# Patient Record
Sex: FEMALE | Race: BLACK OR AFRICAN AMERICAN | NOT HISPANIC OR LATINO | Employment: UNEMPLOYED | URBAN - METROPOLITAN AREA
[De-identification: names, ages, dates, MRNs, and addresses within clinical notes are randomized per-mention and may not be internally consistent; named-entity substitution may affect disease eponyms.]

---

## 2023-03-21 ENCOUNTER — TELEPHONE (OUTPATIENT)
Dept: PRIMARY CARE | Facility: CLINIC | Age: 38
End: 2023-03-21
Payer: COMMERCIAL

## 2023-03-21 DIAGNOSIS — A60.00 GENITAL HERPES SIMPLEX, UNSPECIFIED SITE: ICD-10-CM

## 2023-03-21 DIAGNOSIS — R21 RASH: Primary | ICD-10-CM

## 2023-03-22 NOTE — TELEPHONE ENCOUNTER
Patient called and wanted these meds hydrocortisone 2% cream and hydrocortisone 2.5% lotion sent to her pharmacy.

## 2023-03-24 PROBLEM — A60.00 HERPES, GENITAL: Status: ACTIVE | Noted: 2023-03-24

## 2023-03-24 RX ORDER — TRIAMCINOLONE ACETONIDE 0.25 MG/G
OINTMENT TOPICAL 3 TIMES DAILY
Qty: 6 G | Refills: 1 | Status: SHIPPED | OUTPATIENT
Start: 2023-03-24 | End: 2023-03-27

## 2023-03-24 RX ORDER — VALACYCLOVIR HYDROCHLORIDE 1 G/1
1 TABLET, FILM COATED ORAL 3 TIMES DAILY
COMMUNITY
Start: 2014-08-18 | End: 2023-03-24 | Stop reason: SDUPTHER

## 2023-03-24 RX ORDER — TRIAMCINOLONE ACETONIDE 0.25 MG/G
1 CREAM TOPICAL 2 TIMES DAILY
Qty: 6 G | Refills: 1 | Status: SHIPPED | OUTPATIENT
Start: 2023-03-24 | End: 2023-03-27

## 2023-03-24 RX ORDER — TRIAMCINOLONE ACETONIDE 0.25 MG/G
1 CREAM TOPICAL 2 TIMES DAILY
COMMUNITY
Start: 2022-07-19 | End: 2023-03-24 | Stop reason: SDUPTHER

## 2023-03-24 RX ORDER — TRIAMCINOLONE ACETONIDE 0.25 MG/G
OINTMENT TOPICAL 3 TIMES DAILY
COMMUNITY
Start: 2021-04-13 | End: 2023-03-24 | Stop reason: SDUPTHER

## 2023-03-24 RX ORDER — VALACYCLOVIR HYDROCHLORIDE 1 G/1
1000 TABLET, FILM COATED ORAL 3 TIMES DAILY
Qty: 21 TABLET | Refills: 0 | Status: SHIPPED | OUTPATIENT
Start: 2023-03-24 | End: 2023-03-31

## 2023-03-25 DIAGNOSIS — R21 RASH: ICD-10-CM

## 2023-03-27 RX ORDER — TRIAMCINOLONE ACETONIDE 0.25 MG/G
OINTMENT TOPICAL
Qty: 6 G | Refills: 1 | Status: SHIPPED | OUTPATIENT
Start: 2023-03-27 | End: 2024-01-15 | Stop reason: SDUPTHER

## 2023-06-23 ENCOUNTER — LAB (OUTPATIENT)
Dept: LAB | Facility: LAB | Age: 38
End: 2023-06-23
Payer: COMMERCIAL

## 2023-06-23 ENCOUNTER — APPOINTMENT (OUTPATIENT)
Dept: PRIMARY CARE | Facility: CLINIC | Age: 38
End: 2023-06-23
Payer: COMMERCIAL

## 2023-06-23 ENCOUNTER — OFFICE VISIT (OUTPATIENT)
Dept: PRIMARY CARE | Facility: CLINIC | Age: 38
End: 2023-06-23
Payer: COMMERCIAL

## 2023-06-23 VITALS
HEIGHT: 67 IN | SYSTOLIC BLOOD PRESSURE: 121 MMHG | DIASTOLIC BLOOD PRESSURE: 75 MMHG | HEART RATE: 56 BPM | BODY MASS INDEX: 40.65 KG/M2 | WEIGHT: 259 LBS

## 2023-06-23 DIAGNOSIS — F32.A DEPRESSION, UNSPECIFIED DEPRESSION TYPE: Primary | ICD-10-CM

## 2023-06-23 DIAGNOSIS — F41.9 ANXIETY: ICD-10-CM

## 2023-06-23 DIAGNOSIS — Z00.00 HEALTH CARE MAINTENANCE: ICD-10-CM

## 2023-06-23 DIAGNOSIS — E66.01 CLASS 3 SEVERE OBESITY DUE TO EXCESS CALORIES WITH SERIOUS COMORBIDITY AND BODY MASS INDEX (BMI) OF 40.0 TO 44.9 IN ADULT (MULTI): ICD-10-CM

## 2023-06-23 PROBLEM — E66.813 CLASS 3 SEVERE OBESITY DUE TO EXCESS CALORIES WITH BODY MASS INDEX (BMI) OF 40.0 TO 44.9 IN ADULT: Status: ACTIVE | Noted: 2023-06-23

## 2023-06-23 PROBLEM — F17.210 CIGARETTE NICOTINE DEPENDENCE WITHOUT COMPLICATION: Status: ACTIVE | Noted: 2023-06-23

## 2023-06-23 LAB
ALANINE AMINOTRANSFERASE (SGPT) (U/L) IN SER/PLAS: 12 U/L (ref 7–45)
ALBUMIN (G/DL) IN SER/PLAS: 4.2 G/DL (ref 3.4–5)
ALKALINE PHOSPHATASE (U/L) IN SER/PLAS: 48 U/L (ref 33–110)
ANION GAP IN SER/PLAS: 14 MMOL/L (ref 10–20)
ASPARTATE AMINOTRANSFERASE (SGOT) (U/L) IN SER/PLAS: 11 U/L (ref 9–39)
BILIRUBIN TOTAL (MG/DL) IN SER/PLAS: 0.3 MG/DL (ref 0–1.2)
CALCIUM (MG/DL) IN SER/PLAS: 9.2 MG/DL (ref 8.6–10.6)
CARBON DIOXIDE, TOTAL (MMOL/L) IN SER/PLAS: 26 MMOL/L (ref 21–32)
CHLORIDE (MMOL/L) IN SER/PLAS: 104 MMOL/L (ref 98–107)
CHOLESTEROL (MG/DL) IN SER/PLAS: 154 MG/DL (ref 0–199)
CHOLESTEROL IN HDL (MG/DL) IN SER/PLAS: 55.5 MG/DL
CHOLESTEROL/HDL RATIO: 2.8
CREATININE (MG/DL) IN SER/PLAS: 0.84 MG/DL (ref 0.5–1.05)
ERYTHROCYTE DISTRIBUTION WIDTH (RATIO) BY AUTOMATED COUNT: 15.9 % (ref 11.5–14.5)
ERYTHROCYTE MEAN CORPUSCULAR HEMOGLOBIN CONCENTRATION (G/DL) BY AUTOMATED: 30.8 G/DL (ref 32–36)
ERYTHROCYTE MEAN CORPUSCULAR VOLUME (FL) BY AUTOMATED COUNT: 90 FL (ref 80–100)
ERYTHROCYTES (10*6/UL) IN BLOOD BY AUTOMATED COUNT: 3.87 X10E12/L (ref 4–5.2)
GFR FEMALE: >90 ML/MIN/1.73M2
GLUCOSE (MG/DL) IN SER/PLAS: 93 MG/DL (ref 74–99)
HEMATOCRIT (%) IN BLOOD BY AUTOMATED COUNT: 34.7 % (ref 36–46)
HEMOGLOBIN (G/DL) IN BLOOD: 10.7 G/DL (ref 12–16)
LDL: 74 MG/DL (ref 0–99)
LEUKOCYTES (10*3/UL) IN BLOOD BY AUTOMATED COUNT: 6.9 X10E9/L (ref 4.4–11.3)
NRBC (PER 100 WBCS) BY AUTOMATED COUNT: 0 /100 WBC (ref 0–0)
PLATELETS (10*3/UL) IN BLOOD AUTOMATED COUNT: 325 X10E9/L (ref 150–450)
POTASSIUM (MMOL/L) IN SER/PLAS: 4.7 MMOL/L (ref 3.5–5.3)
PROTEIN TOTAL: 7.2 G/DL (ref 6.4–8.2)
SODIUM (MMOL/L) IN SER/PLAS: 139 MMOL/L (ref 136–145)
THYROTROPIN (MIU/L) IN SER/PLAS BY DETECTION LIMIT <= 0.05 MIU/L: 2.07 MIU/L (ref 0.44–3.98)
TRIGLYCERIDE (MG/DL) IN SER/PLAS: 125 MG/DL (ref 0–149)
UREA NITROGEN (MG/DL) IN SER/PLAS: 10 MG/DL (ref 6–23)
VLDL: 25 MG/DL (ref 0–40)

## 2023-06-23 PROCEDURE — 84443 ASSAY THYROID STIM HORMONE: CPT

## 2023-06-23 PROCEDURE — 80053 COMPREHEN METABOLIC PANEL: CPT

## 2023-06-23 PROCEDURE — 36415 COLL VENOUS BLD VENIPUNCTURE: CPT

## 2023-06-23 PROCEDURE — 85027 COMPLETE CBC AUTOMATED: CPT

## 2023-06-23 PROCEDURE — 1036F TOBACCO NON-USER: CPT | Performed by: INTERNAL MEDICINE

## 2023-06-23 PROCEDURE — 99213 OFFICE O/P EST LOW 20 MIN: CPT | Performed by: INTERNAL MEDICINE

## 2023-06-23 PROCEDURE — 80061 LIPID PANEL: CPT

## 2023-06-23 PROCEDURE — 3008F BODY MASS INDEX DOCD: CPT | Performed by: INTERNAL MEDICINE

## 2023-06-23 RX ORDER — DULAGLUTIDE 0.75 MG/.5ML
0.75 INJECTION, SOLUTION SUBCUTANEOUS WEEKLY
COMMUNITY
Start: 2023-05-31

## 2023-06-23 RX ORDER — BUPROPION HYDROCHLORIDE 150 MG/1
150 TABLET ORAL EVERY MORNING
Qty: 30 TABLET | Refills: 1 | Status: SHIPPED | OUTPATIENT
Start: 2023-06-23 | End: 2023-08-22

## 2023-06-23 ASSESSMENT — PATIENT HEALTH QUESTIONNAIRE - PHQ9
SUM OF ALL RESPONSES TO PHQ9 QUESTIONS 1 AND 2: 6
2. FEELING DOWN, DEPRESSED OR HOPELESS: NEARLY EVERY DAY
1. LITTLE INTEREST OR PLEASURE IN DOING THINGS: NEARLY EVERY DAY

## 2023-06-23 NOTE — PROGRESS NOTES
"Subjective   Patient ID: Albertina Mckee is a 37 y.o. female who presents for sick visit           HPI     Patient is a 37-year-old female with past medical history of obesity presents with chief complaint of ongoing anxiety and depression.  She has had multiple triggers in the last year.  She has been having difficulty with her landlord.  She is in subsidized housing and [employer]  is supposed to be paying for her rent however her landlord is claiming that they are not paying and she owes back money.  She also got  in November.  She also states that she had an  in the last 3 months.  She is quite emotional and symptoms have been getting worse after the .  No alleviating factors.  She is not currently on any medications.  She was recently prescribed Trulicity for treatment of obesity at the [facility].  She denies suicidal homicidal ideation.  She currently works for her  who runs a [employer] business    Review of Systems  Constitutional: No fever or chills, No Night Sweats  Eyes: No Blurry Vision or Eye sight problems  ENT: No Nasal Discharge, Hoarseness, sore throat  Cardiovascular: no chest pain, no palpitations and no syncope.   Respiratory: no cough, no shortness of breath during exertion and no shortness of breath at rest.   Gastrointestinal: no abdominal pain, no nausea and no vomiting.   : No vaginal discharge, burning with urination, no blood in urine or stools  Skin: No Skin rashes or Lesions  Neuro: No Headache, no dizziness or Numbness or tingling  Psych: No Anxiety, depression or sleeping problems  Heme: No Easy bleeding or brusing.     Objective   /75   Pulse 56   Ht 1.702 m (5' 7\")   Wt 117 kg (259 lb)   BMI 40.57 kg/m²     Physical Exam  Patient declined Chaperone  Constitutional: Alert and in no acute distress. Well developed, well nourished.   Head and Face: Head and face: Normal.    Eyes: Normal external exam. Pupils were equal in size, round, reactive to " light (PERRL) with normal accommodation and extraocular movements intact (EOMI).   Ears, Nose, Mouth, and Throat: External inspection of ears and nose: Normal.  Hearing: Normal.  Nasal mucosa, septum, and turbinates: Normal.  Lips, teeth, and gums: Normal.  Oropharynx: Normal.   Neck: No neck mass was observed. Supple. Thyroid not enlarged and there were no palpable thyroid nodules.   Cardiovascular: Heart rate and rhythm were normal, normal S1 and S2. Pedal pulses: Normal. No peripheral edema.   Pulmonary: No respiratory distress. Clear bilateral breath sounds.   Breast: Normal Appearance, No Masses or lumps palpated  Abdomen: Soft nontender; no abdominal mass palpated. Normal bowel sounds. No organomegaly.   : Deferred   Musculoskeletal: No joint swelling seen, normal movements of all extremities. Range of motion: Normal.  Muscle strength/tone: Normal.    Skin: Normal skin color and pigmentation, normal skin turgor, and no rash.   Neurologic: Deep tendon reflexes were 2+ and symmetric.   Psychiatric: Judgment and insight: Intact. Mood and affect: Normal.  Lymphatic: No cervical lymphadenopathy. Palpation of lymph nodes in axillae: Normal.  Palpation of lymph nodes in groin: Normal.    Lab Results   Component Value Date    WBC 7.8 09/25/2020    HGB 10.9 (L) 09/25/2020    HCT 34.7 (L) 09/25/2020     09/25/2020    CHOL 134 09/25/2020    TRIG 104 09/25/2020    HDL 50.0 09/25/2020    ALT 18 09/25/2020    AST 13 09/25/2020     04/13/2021    K 3.8 04/13/2021     04/13/2021    CREATININE 0.85 04/13/2021    BUN 10 04/13/2021    CO2 27 04/13/2021    TSH 4.17 (H) 04/13/2021    HGBA1C 5.2 08/28/2018       US LIMITED BREAST  Narrative: Interpreted By:  KWASI DICKSON MD and MARIA DOLORES FREITAS MD  MRN: [id number]  Patient Name: RAFIA PALUMBO     STUDY:  BREAST ULTRASOUND; DIGITAL DIAG MAMM BILAT WITH DEV;  2/17/2023  12:18 pm; 2/17/2023 11:57 am     ACCESSION NUMBER(S):  [id number]; [id number]     ORDERING  CLINICIAN:  KELI BECK     INDICATION:  Patient reports a 1-1/2 year history of left breast hardness with  intermittent pain in the upper inner quadrant. Left breast lumps  between 12 and 2 o'clock.     COMPARISON:  Baseline exam.     FINDINGS:  MAMMOGRAPHY: 2D and tomosynthesis images were reviewed at 1 mm slice  thickness.     There are areas of scattered fibroglandular tissue.  No suspicious  masses or calcifications are identified.     ULTRASOUND:  Targeted ultrasound of the left breast was performed  using elastography.     Generous scanning of the entire superior left breast in the area of  pain and palpable lump was performed. No focal sonographic  abnormality is identified. The breast parenchyma is soft on  elastography.     Impression: No mammographic or targeted sonographic evidence of malignancy.  Recommend clinical management and follow-up for patient's  symptomatology.     BI-RADS CATEGORY:  Category: 1 - Negative.  Recommendation: Clinical management and follow-up.     For any future breast imaging appointments, please call [phone]).     Patient letter sent SAAPPR     I personally reviewed the images/study and I agree with the findings  as stated by Dr. Dial.  DIGITAL DIAG MAMM  Narrative: Interpreted By:  KWASI DICKSON MD and MARIA DOLORES FREITAS MD  MRN: [id number]  Patient Name: RAFIA PALUMBO     STUDY:  BREAST ULTRASOUND; DIGITAL DIAG MAMM BILAT WITH DEV;  2/17/2023  12:18 pm; 2/17/2023 11:57 am     ACCESSION NUMBER(S):  [id number]; [id number]     ORDERING CLINICIAN:  KELI BECK     INDICATION:  Patient reports a 1-1/2 year history of left breast hardness with  intermittent pain in the upper inner quadrant. Left breast lumps  between 12 and 2 o'clock.     COMPARISON:  Baseline exam.     FINDINGS:  MAMMOGRAPHY: 2D and tomosynthesis images were reviewed at 1 mm slice  thickness.     There are areas of scattered fibroglandular tissue.  No suspicious  masses or  calcifications are identified.     ULTRASOUND:  Targeted ultrasound of the left breast was performed  using elastography.     Generous scanning of the entire superior left breast in the area of  pain and palpable lump was performed. No focal sonographic  abnormality is identified. The breast parenchyma is soft on  elastography.     Impression: No mammographic or targeted sonographic evidence of malignancy.  Recommend clinical management and follow-up for patient's  symptomatology.     BI-RADS CATEGORY:  Category: 1 - Negative.  Recommendation: Clinical management and follow-up.     For any future breast imaging appointments, please call [phone] ([phone]).     Patient letter sent SAANIKKOR     I personally reviewed the images/study and I agree with the findings  as stated by Dr. Dial.      Assessment/Plan   Problem List Items Addressed This Visit          Endocrine/Metabolic    Class 3 severe obesity due to excess calories with body mass index (BMI) of 40.0 to 44.9 in adult (CMS/[facility])     Continue following with [facility] bariatric team.  Seems she was prescribed Trulicity for weight loss.  Titrate medication per team at [facility]            Other    Depression - Primary     Patient with multiple triggers at this time.  Deferring psychology evaluation at this time.  We will trial Wellbutrin 150 mg p.o. daily follow-up 1 month for reevaluation.         Relevant Medications    buPROPion XL (Wellbutrin XL) 150 mg 24 hr tablet    Anxiety    Relevant Medications    buPROPion XL (Wellbutrin XL) 150 mg 24 hr tablet     Other Visit Diagnoses       Health care maintenance        Relevant Orders    CBC    Comprehensive metabolic panel    Lipid Panel    TSH with reflex to Free T4 if abnormal

## 2023-06-23 NOTE — ASSESSMENT & PLAN NOTE
Continue following with [facility] bariatric team.  Seems she was prescribed Trulicity for weight loss.  Titrate medication per team at [facility]

## 2023-06-23 NOTE — ASSESSMENT & PLAN NOTE
Smoking Cessation   -Smoking coaches are available to you if need help with quitting ----->[phone]  -Try to cut back every day until you are able to quit   -If you need more resources to quit smoking I am here to help   -set yourself a quit date and work towards that goal    We will prescribe Wellbutrin.  Follow-up 30 days

## 2023-06-23 NOTE — ASSESSMENT & PLAN NOTE
Patient with multiple triggers at this time.  Deferring psychology evaluation at this time.  We will trial Wellbutrin 150 mg p.o. daily follow-up 1 month for reevaluation.

## 2023-06-28 ENCOUNTER — TELEPHONE (OUTPATIENT)
Dept: PRIMARY CARE | Facility: CLINIC | Age: 38
End: 2023-06-28

## 2023-08-15 ENCOUNTER — CLINICAL SUPPORT (OUTPATIENT)
Dept: PRIMARY CARE | Facility: CLINIC | Age: 38
End: 2023-08-15
Payer: COMMERCIAL

## 2023-08-15 DIAGNOSIS — Z11.1 PPD SCREENING TEST: Primary | ICD-10-CM

## 2023-08-15 PROCEDURE — 86580 TB INTRADERMAL TEST: CPT | Performed by: INTERNAL MEDICINE

## 2023-08-17 ENCOUNTER — CLINICAL SUPPORT (OUTPATIENT)
Dept: PRIMARY CARE | Facility: CLINIC | Age: 38
End: 2023-08-17
Payer: COMMERCIAL

## 2023-08-17 LAB
INDURATION: 0 MM
TB SKIN TEST: NEGATIVE

## 2023-09-23 DIAGNOSIS — K21.9 LARYNGOPHARYNGEAL REFLUX: ICD-10-CM

## 2023-09-25 RX ORDER — OMEPRAZOLE 40 MG/1
40 CAPSULE, DELAYED RELEASE ORAL DAILY
Qty: 90 CAPSULE | Refills: 1 | Status: SHIPPED | OUTPATIENT
Start: 2023-09-25

## 2023-10-02 PROBLEM — N63.21 BREAST LUMP ON LEFT SIDE AT 2 O'CLOCK POSITION: Status: ACTIVE | Noted: 2023-10-02

## 2023-10-02 PROBLEM — M77.9 TENDONITIS: Status: ACTIVE | Noted: 2023-10-02

## 2023-10-02 PROBLEM — H91.90 IMPAIRED HEARING: Status: ACTIVE | Noted: 2023-10-02

## 2023-10-02 PROBLEM — R29.898 WEAKNESS OF RIGHT LOWER EXTREMITY: Status: ACTIVE | Noted: 2023-10-02

## 2023-10-02 PROBLEM — E66.01 CLASS 2 SEVERE OBESITY WITH SERIOUS COMORBIDITY AND BODY MASS INDEX (BMI) OF 38.0 TO 38.9 IN ADULT (MULTI): Status: ACTIVE | Noted: 2023-10-02

## 2023-10-02 PROBLEM — E66.9 OBESITY, CLASS II, BMI 35-39.9: Status: ACTIVE | Noted: 2023-10-02

## 2023-10-02 PROBLEM — E66.9 OBESITY, CLASS I, BMI 30-34.9: Status: ACTIVE | Noted: 2023-10-02

## 2023-10-02 PROBLEM — M54.50 CHRONIC LOWER BACK PAIN: Status: ACTIVE | Noted: 2023-10-02

## 2023-10-02 PROBLEM — E66.812 OBESITY, CLASS II, BMI 35-39.9: Status: ACTIVE | Noted: 2023-10-02

## 2023-10-02 PROBLEM — M18.12 PRIMARY OSTEOARTHRITIS OF FIRST CARPOMETACARPAL JOINT OF LEFT HAND: Status: ACTIVE | Noted: 2023-10-02

## 2023-10-02 PROBLEM — M22.41 CHONDROMALACIA OF RIGHT PATELLOFEMORAL JOINT: Status: ACTIVE | Noted: 2023-10-02

## 2023-10-02 PROBLEM — E66.01 OBESITY, CLASS III, BMI 40-49.9 (MORBID OBESITY) (MULTI): Status: ACTIVE | Noted: 2023-10-02

## 2023-10-02 PROBLEM — S83.412A GRADE 1 INJURY OF MEDIAL COLLATERAL LIGAMENT OF LEFT KNEE: Status: ACTIVE | Noted: 2023-10-02

## 2023-10-02 PROBLEM — M17.10 PATELLOFEMORAL ARTHRITIS: Status: ACTIVE | Noted: 2023-10-02

## 2023-10-02 PROBLEM — O02.1 MISSED ABORTION (HHS-HCC): Status: ACTIVE | Noted: 2023-10-02

## 2023-10-02 PROBLEM — D64.9 ANEMIA: Status: ACTIVE | Noted: 2023-10-02

## 2023-10-02 PROBLEM — M12.812 ROTATOR CUFF ARTHROPATHY OF LEFT SHOULDER: Status: ACTIVE | Noted: 2023-10-02

## 2023-10-02 PROBLEM — S80.02XA CONTUSION OF KNEE, LEFT: Status: ACTIVE | Noted: 2023-10-02

## 2023-10-02 PROBLEM — S42.253A GREATER TUBEROSITY OF HUMERUS FRACTURE: Status: ACTIVE | Noted: 2023-10-02

## 2023-10-02 PROBLEM — E66.811 OBESITY, CLASS I, BMI 30-34.9: Status: ACTIVE | Noted: 2023-10-02

## 2023-10-02 PROBLEM — G89.29 CHRONIC LOWER BACK PAIN: Status: ACTIVE | Noted: 2023-10-02

## 2023-10-02 PROBLEM — O03.4 INCOMPLETE ABORTION (HHS-HCC): Status: ACTIVE | Noted: 2023-10-02

## 2023-10-02 PROBLEM — M70.22 OLECRANON BURSITIS OF LEFT ELBOW: Status: ACTIVE | Noted: 2023-10-02

## 2023-10-02 PROBLEM — E66.813 OBESITY, CLASS III, BMI 40-49.9 (MORBID OBESITY): Status: ACTIVE | Noted: 2023-10-02

## 2023-10-02 PROBLEM — O20.0 THREATENED ABORTION (HHS-HCC): Status: ACTIVE | Noted: 2023-10-02

## 2023-10-02 PROBLEM — O20.0 THREATENED MISCARRIAGE (HHS-HCC): Status: ACTIVE | Noted: 2023-10-02

## 2023-10-02 PROBLEM — M23.8X9: Status: ACTIVE | Noted: 2023-10-02

## 2023-10-02 PROBLEM — K21.9 LARYNGOPHARYNGEAL REFLUX (LPR): Status: ACTIVE | Noted: 2023-10-02

## 2023-10-02 PROBLEM — E66.812 CLASS 2 SEVERE OBESITY WITH SERIOUS COMORBIDITY AND BODY MASS INDEX (BMI) OF 38.0 TO 38.9 IN ADULT: Status: ACTIVE | Noted: 2023-10-02

## 2023-10-02 RX ORDER — HYDROCORTISONE 1 %
CREAM (GRAM) TOPICAL 2 TIMES DAILY
COMMUNITY
End: 2024-01-15 | Stop reason: SDUPTHER

## 2023-10-02 RX ORDER — FLUTICASONE PROPIONATE 50 MCG
1 SPRAY, SUSPENSION (ML) NASAL EVERY 12 HOURS
COMMUNITY

## 2023-10-05 ENCOUNTER — OFFICE VISIT (OUTPATIENT)
Dept: ORTHOPEDIC SURGERY | Facility: CLINIC | Age: 38
End: 2023-10-05
Payer: COMMERCIAL

## 2023-10-05 ENCOUNTER — ANCILLARY PROCEDURE (OUTPATIENT)
Dept: RADIOLOGY | Facility: CLINIC | Age: 38
End: 2023-10-05
Payer: COMMERCIAL

## 2023-10-05 VITALS — WEIGHT: 258 LBS | HEIGHT: 67 IN | BODY MASS INDEX: 40.49 KG/M2

## 2023-10-05 DIAGNOSIS — M17.10 PATELLOFEMORAL ARTHRITIS: ICD-10-CM

## 2023-10-05 PROCEDURE — 73564 X-RAY EXAM KNEE 4 OR MORE: CPT | Mod: RIGHT SIDE | Performed by: RADIOLOGY

## 2023-10-05 PROCEDURE — 3008F BODY MASS INDEX DOCD: CPT | Performed by: ORTHOPAEDIC SURGERY

## 2023-10-05 PROCEDURE — 73560 X-RAY EXAM OF KNEE 1 OR 2: CPT | Mod: LT

## 2023-10-05 PROCEDURE — 99213 OFFICE O/P EST LOW 20 MIN: CPT | Performed by: ORTHOPAEDIC SURGERY

## 2023-10-05 PROCEDURE — 73562 X-RAY EXAM OF KNEE 3: CPT | Mod: RT

## 2023-10-05 ASSESSMENT — PAIN - FUNCTIONAL ASSESSMENT: PAIN_FUNCTIONAL_ASSESSMENT: 0-10

## 2023-10-05 ASSESSMENT — PAIN SCALES - GENERAL: PAINLEVEL_OUTOF10: 7

## 2023-10-05 ASSESSMENT — PAIN DESCRIPTION - DESCRIPTORS: DESCRIPTORS: ACHING;SHARP;DISCOMFORT

## 2023-10-05 NOTE — LETTER
October 5, 2023     Topher Rob DO  [address]   [address]  [facility] [address]    Patient: Albertina Mckee   YOB: 1985   Date of Visit: 10/5/2023       Dear Dr. Topher Rob DO:    Thank you for referring Albertina Mckee to me for evaluation. Below are my notes for this consultation.  If you have questions, please do not hesitate to call me. I look forward to following your patient along with you.       Sincerely,     Tesfaye Mendez MD      CC: No Recipients  ______________________________________________________________________________________    Patient is a 37-year-old female presents today for evaluation of bilateral knee pain.  She previously saw Dr. Giovanny cerda.  It sounds like she had arthroscopy on the right side.  She is a previous cortisone injections.  She is never had viscosupplementation.  Her knees bother her equally.  She has difficulty with distance and stairs.    Adult patient history sheet was filled out by the patient today in clinic.  This includes Past Medical History, Past Surgical History, Medications, Allergies, Social History, Family History and 30 point review of systems.    Bilateral knees:  AAOx3, NAD,, obese, walks with a mild antalgic gait  Varus allignment  Range of motion lacks 10 degrees of full extension and flexes to 110 degrees  Stable to varus/valgus/anterior/posterior stress through out the range of motion  Slight laxity with varus stress  Diffuse medial  joint line tenderness to palpation  Moderate effusion  SILT in a joe/saph/per/tib distribution  5/5 knee extension/df/pf/ehl  ½ dorsalis pedis and posterior tibial pulse  no popliteal lymphadenopathy  no other overlying lesions  mood: euthymic  Respirations non labored    Plain films were reviewed by myself in clinic today.  She has mild to moderate degenerative changes otherwise negative for osseous or soft tissue abnormality.    We discussed continued conservative treatment including  anti-inflammatories, injections, therapy and weight loss.  She is not a candidate at 37 for knee replacement at this time.  She can continue activity as tolerated.  She can follow-up with us on an as-needed basis.  We did give her Dr. John card for possible Khushboo supplementation if she desires.    This note was created using voice recognition software and was not corrected for typographical or grammatical errors.

## 2023-10-05 NOTE — PROGRESS NOTES
Patient is a 37-year-old female presents today for evaluation of bilateral knee pain.  She previously saw Dr. Giovanny cerda.  It sounds like she had arthroscopy on the right side.  She is a previous cortisone injections.  She is never had viscosupplementation.  Her knees bother her equally.  She has difficulty with distance and stairs.    Adult patient history sheet was filled out by the patient today in clinic.  This includes Past Medical History, Past Surgical History, Medications, Allergies, Social History, Family History and 30 point review of systems.    Bilateral knees:  AAOx3, NAD,, obese, walks with a mild antalgic gait  Varus allignment  Range of motion lacks 10 degrees of full extension and flexes to 110 degrees  Stable to varus/valgus/anterior/posterior stress through out the range of motion  Slight laxity with varus stress  Diffuse medial  joint line tenderness to palpation  Moderate effusion  SILT in a joe/saph/per/tib distribution  5/5 knee extension/df/pf/ehl  ½ dorsalis pedis and posterior tibial pulse  no popliteal lymphadenopathy  no other overlying lesions  mood: euthymic  Respirations non labored    Plain films were reviewed by myself in clinic today.  She has mild to moderate degenerative changes otherwise negative for osseous or soft tissue abnormality.    We discussed continued conservative treatment including anti-inflammatories, injections, therapy and weight loss.  She is not a candidate at 37 for knee replacement at this time.  She can continue activity as tolerated.  She can follow-up with us on an as-needed basis.  We did give her Dr. John card for possible Khushboo supplementation if she desires.    This note was created using voice recognition software and was not corrected for typographical or grammatical errors.

## 2023-12-18 ENCOUNTER — APPOINTMENT (OUTPATIENT)
Dept: PRIMARY CARE | Facility: CLINIC | Age: 38
End: 2023-12-18
Payer: COMMERCIAL

## 2024-01-15 ENCOUNTER — OFFICE VISIT (OUTPATIENT)
Dept: PRIMARY CARE | Facility: CLINIC | Age: 39
End: 2024-01-15
Payer: COMMERCIAL

## 2024-01-15 ENCOUNTER — LAB (OUTPATIENT)
Dept: LAB | Facility: LAB | Age: 39
End: 2024-01-15
Payer: COMMERCIAL

## 2024-01-15 VITALS
HEIGHT: 67 IN | HEART RATE: 58 BPM | WEIGHT: 260 LBS | SYSTOLIC BLOOD PRESSURE: 135 MMHG | OXYGEN SATURATION: 98 % | DIASTOLIC BLOOD PRESSURE: 84 MMHG | BODY MASS INDEX: 40.81 KG/M2

## 2024-01-15 DIAGNOSIS — A60.00 GENITAL HERPES SIMPLEX, UNSPECIFIED SITE: ICD-10-CM

## 2024-01-15 DIAGNOSIS — F17.210 CIGARETTE NICOTINE DEPENDENCE WITHOUT COMPLICATION: ICD-10-CM

## 2024-01-15 DIAGNOSIS — G89.29 CHRONIC LOW BACK PAIN, UNSPECIFIED BACK PAIN LATERALITY, UNSPECIFIED WHETHER SCIATICA PRESENT: ICD-10-CM

## 2024-01-15 DIAGNOSIS — D64.9 ANEMIA, UNSPECIFIED TYPE: Primary | ICD-10-CM

## 2024-01-15 DIAGNOSIS — M54.50 CHRONIC LOW BACK PAIN, UNSPECIFIED BACK PAIN LATERALITY, UNSPECIFIED WHETHER SCIATICA PRESENT: ICD-10-CM

## 2024-01-15 DIAGNOSIS — M25.569 KNEE PAIN, UNSPECIFIED CHRONICITY, UNSPECIFIED LATERALITY: ICD-10-CM

## 2024-01-15 DIAGNOSIS — B00.9 HSV INFECTION: ICD-10-CM

## 2024-01-15 DIAGNOSIS — B37.9 YEAST INFECTION: ICD-10-CM

## 2024-01-15 DIAGNOSIS — E66.01 CLASS 3 SEVERE OBESITY DUE TO EXCESS CALORIES WITH SERIOUS COMORBIDITY AND BODY MASS INDEX (BMI) OF 40.0 TO 44.9 IN ADULT (MULTI): ICD-10-CM

## 2024-01-15 DIAGNOSIS — Z00.00 HEALTH CARE MAINTENANCE: ICD-10-CM

## 2024-01-15 DIAGNOSIS — R21 RASH: ICD-10-CM

## 2024-01-15 PROBLEM — E66.811 OBESITY, CLASS I, BMI 30-34.9: Status: RESOLVED | Noted: 2023-10-02 | Resolved: 2024-01-15

## 2024-01-15 PROBLEM — E66.9 OBESITY, CLASS I, BMI 30-34.9: Status: RESOLVED | Noted: 2023-10-02 | Resolved: 2024-01-15

## 2024-01-15 PROBLEM — O20.0 THREATENED ABORTION (HHS-HCC): Status: RESOLVED | Noted: 2023-10-02 | Resolved: 2024-01-15

## 2024-01-15 PROBLEM — N63.21 BREAST LUMP ON LEFT SIDE AT 2 O'CLOCK POSITION: Status: RESOLVED | Noted: 2023-10-02 | Resolved: 2024-01-15

## 2024-01-15 PROBLEM — O20.0 THREATENED MISCARRIAGE (HHS-HCC): Status: RESOLVED | Noted: 2023-10-02 | Resolved: 2024-01-15

## 2024-01-15 PROBLEM — O02.1 MISSED ABORTION (HHS-HCC): Status: RESOLVED | Noted: 2023-10-02 | Resolved: 2024-01-15

## 2024-01-15 PROBLEM — E66.812 OBESITY, CLASS II, BMI 35-39.9: Status: RESOLVED | Noted: 2023-10-02 | Resolved: 2024-01-15

## 2024-01-15 PROBLEM — E66.812 CLASS 2 SEVERE OBESITY WITH SERIOUS COMORBIDITY AND BODY MASS INDEX (BMI) OF 38.0 TO 38.9 IN ADULT: Status: RESOLVED | Noted: 2023-10-02 | Resolved: 2024-01-15

## 2024-01-15 PROBLEM — E66.813 OBESITY, CLASS III, BMI 40-49.9 (MORBID OBESITY): Status: RESOLVED | Noted: 2023-10-02 | Resolved: 2024-01-15

## 2024-01-15 PROBLEM — O03.4 INCOMPLETE ABORTION (HHS-HCC): Status: RESOLVED | Noted: 2023-10-02 | Resolved: 2024-01-15

## 2024-01-15 PROBLEM — K21.9 LARYNGOPHARYNGEAL REFLUX (LPR): Status: RESOLVED | Noted: 2023-10-02 | Resolved: 2024-01-15

## 2024-01-15 PROBLEM — E66.9 OBESITY, CLASS II, BMI 35-39.9: Status: RESOLVED | Noted: 2023-10-02 | Resolved: 2024-01-15

## 2024-01-15 LAB
ALBUMIN SERPL BCP-MCNC: 4 G/DL (ref 3.4–5)
ALP SERPL-CCNC: 45 U/L (ref 33–110)
ALT SERPL W P-5'-P-CCNC: 9 U/L (ref 7–45)
ANION GAP SERPL CALC-SCNC: 9 MMOL/L (ref 10–20)
APPEARANCE UR: ABNORMAL
AST SERPL W P-5'-P-CCNC: 8 U/L (ref 9–39)
BACTERIA #/AREA URNS AUTO: ABNORMAL /HPF
BILIRUB SERPL-MCNC: 0.3 MG/DL (ref 0–1.2)
BILIRUB UR STRIP.AUTO-MCNC: NEGATIVE MG/DL
BUN SERPL-MCNC: 7 MG/DL (ref 6–23)
CALCIUM SERPL-MCNC: 9 MG/DL (ref 8.6–10.6)
CHLORIDE SERPL-SCNC: 106 MMOL/L (ref 98–107)
CO2 SERPL-SCNC: 28 MMOL/L (ref 21–32)
COLOR UR: YELLOW
CREAT SERPL-MCNC: 0.76 MG/DL (ref 0.5–1.05)
EGFRCR SERPLBLD CKD-EPI 2021: >90 ML/MIN/1.73M*2
ERYTHROCYTE [DISTWIDTH] IN BLOOD BY AUTOMATED COUNT: 15.9 % (ref 11.5–14.5)
GLUCOSE SERPL-MCNC: 76 MG/DL (ref 74–99)
GLUCOSE UR STRIP.AUTO-MCNC: NEGATIVE MG/DL
HCT VFR BLD AUTO: 34.7 % (ref 36–46)
HGB BLD-MCNC: 11 G/DL (ref 12–16)
KETONES UR STRIP.AUTO-MCNC: NEGATIVE MG/DL
LEUKOCYTE ESTERASE UR QL STRIP.AUTO: ABNORMAL
MCH RBC QN AUTO: 28.2 PG (ref 26–34)
MCHC RBC AUTO-ENTMCNC: 31.7 G/DL (ref 32–36)
MCV RBC AUTO: 89 FL (ref 80–100)
MUCOUS THREADS #/AREA URNS AUTO: ABNORMAL /LPF
NITRITE UR QL STRIP.AUTO: NEGATIVE
NRBC BLD-RTO: 0 /100 WBCS (ref 0–0)
PH UR STRIP.AUTO: 7 [PH]
PLATELET # BLD AUTO: 340 X10*3/UL (ref 150–450)
POTASSIUM SERPL-SCNC: 4.1 MMOL/L (ref 3.5–5.3)
PROT SERPL-MCNC: 6.9 G/DL (ref 6.4–8.2)
PROT UR STRIP.AUTO-MCNC: NEGATIVE MG/DL
RBC # BLD AUTO: 3.9 X10*6/UL (ref 4–5.2)
RBC # UR STRIP.AUTO: ABNORMAL /UL
RBC #/AREA URNS AUTO: ABNORMAL /HPF
SODIUM SERPL-SCNC: 139 MMOL/L (ref 136–145)
SP GR UR STRIP.AUTO: 1.01
SQUAMOUS #/AREA URNS AUTO: ABNORMAL /HPF
UROBILINOGEN UR STRIP.AUTO-MCNC: <2 MG/DL
WBC # BLD AUTO: 7.6 X10*3/UL (ref 4.4–11.3)
WBC #/AREA URNS AUTO: ABNORMAL /HPF

## 2024-01-15 PROCEDURE — 99214 OFFICE O/P EST MOD 30 MIN: CPT | Performed by: INTERNAL MEDICINE

## 2024-01-15 PROCEDURE — 36415 COLL VENOUS BLD VENIPUNCTURE: CPT

## 2024-01-15 PROCEDURE — 85027 COMPLETE CBC AUTOMATED: CPT

## 2024-01-15 PROCEDURE — 81001 URINALYSIS AUTO W/SCOPE: CPT

## 2024-01-15 PROCEDURE — 3008F BODY MASS INDEX DOCD: CPT | Performed by: INTERNAL MEDICINE

## 2024-01-15 PROCEDURE — 4004F PT TOBACCO SCREEN RCVD TLK: CPT | Performed by: INTERNAL MEDICINE

## 2024-01-15 PROCEDURE — 80053 COMPREHEN METABOLIC PANEL: CPT

## 2024-01-15 RX ORDER — TRIAMCINOLONE ACETONIDE 0.25 MG/G
OINTMENT TOPICAL 3 TIMES DAILY
Qty: 6 G | Refills: 1 | Status: SHIPPED | OUTPATIENT
Start: 2024-01-15 | End: 2024-05-10

## 2024-01-15 RX ORDER — HYDROCORTISONE 1 %
CREAM (GRAM) TOPICAL 2 TIMES DAILY
Qty: 224 G | Refills: 1 | Status: SHIPPED | OUTPATIENT
Start: 2024-01-15

## 2024-01-15 RX ORDER — NAPROXEN 500 MG/1
500 TABLET ORAL 2 TIMES DAILY PRN
Qty: 60 TABLET | Refills: 0 | Status: SHIPPED | OUTPATIENT
Start: 2024-01-15 | End: 2024-04-14

## 2024-01-15 RX ORDER — VALACYCLOVIR HYDROCHLORIDE 500 MG/1
500 TABLET, FILM COATED ORAL DAILY
Qty: 30 TABLET | Refills: 5 | Status: SHIPPED | OUTPATIENT
Start: 2024-01-15 | End: 2025-01-14

## 2024-01-15 RX ORDER — DICLOFENAC SODIUM 10 MG/G
4 GEL TOPICAL 4 TIMES DAILY
Qty: 100 G | Refills: 1 | Status: SHIPPED | OUTPATIENT
Start: 2024-01-15

## 2024-01-15 RX ORDER — FLUCONAZOLE 150 MG/1
150 TABLET ORAL ONCE
Qty: 1 TABLET | Refills: 0 | Status: SHIPPED | OUTPATIENT
Start: 2024-01-15 | End: 2024-01-15

## 2024-01-15 RX ORDER — FERROUS SULFATE 324(65)MG
65 TABLET, DELAYED RELEASE (ENTERIC COATED) ORAL
Qty: 90 TABLET | Refills: 1 | Status: SHIPPED | OUTPATIENT
Start: 2024-01-15

## 2024-01-15 NOTE — PROGRESS NOTES
"Subjective   Patient ID: Albertina Dominguez is a 38 y.o. female who presents for sick visit           HPI     Patient is a 38-year-old female with past medical history of obesity presents for follow-up  Patient is concerned about vaginal itching without odor ongoing x 1 week.  She is concerned about a yeast infection.  No recent antibiotic use    She does have a history of anemia and was advised to take some ferrous sulfate although she has not been using it.  She needs a prescription.    She is concerned about cancer screenings given the fact that an aunt was diagnosed with B-cell lymphoma and she has a cousin diagnosed with cancer but she does not know what it was    She has chronic knee pain for which she is requesting prescription strength ibuprofen.      Review of Systems  Constitutional: No fever or chills, No Night Sweats  Eyes: No Blurry Vision or Eye sight problems  ENT: No Nasal Discharge, Hoarseness, sore throat  Cardiovascular: no chest pain, no palpitations and no syncope.   Respiratory: no cough, no shortness of breath during exertion and no shortness of breath at rest.   Gastrointestinal: no abdominal pain, no nausea and no vomiting.   : No vaginal discharge, burning with urination, no blood in urine or stools  Skin: No Skin rashes or Lesions  Neuro: No Headache, no dizziness or Numbness or tingling  Psych: No Anxiety, depression or sleeping problems  Heme: No Easy bleeding or brusing.     Objective   /84 (BP Location: Right arm, Patient Position: Sitting, BP Cuff Size: Adult)   Pulse 58   Ht 1.702 m (5' 7\")   Wt 118 kg (260 lb)   SpO2 98%   BMI 40.72 kg/m²     Physical Exam  Patient declined Chaperone  Constitutional: Alert and in no acute distress. Well developed, well nourished.   Head and Face: Head and face: Normal.    Eyes: Normal external exam. Pupils were equal in size, round, reactive to light (PERRL) with normal accommodation and extraocular movements intact (EOMI).   Ears, Nose, " Mouth, and Throat: External inspection of ears and nose: Normal.  Hearing: Normal.  Nasal mucosa, septum, and turbinates: Normal.  Lips, teeth, and gums: Normal.  Oropharynx: Normal.   Neck: No neck mass was observed. Supple. Thyroid not enlarged and there were no palpable thyroid nodules.   Cardiovascular: Heart rate and rhythm were normal, normal S1 and S2. Pedal pulses: Normal. No peripheral edema.   Pulmonary: No respiratory distress. Clear bilateral breath sounds.   Breast: Normal Appearance, No Masses or lumps palpated  Abdomen: Soft nontender; no abdominal mass palpated. Normal bowel sounds. No organomegaly.   : Deferred   Musculoskeletal: No joint swelling seen, normal movements of all extremities. Range of motion: Normal.  Muscle strength/tone: Normal.    Skin: Normal skin color and pigmentation, normal skin turgor, and no rash.   Neurologic: Deep tendon reflexes were 2+ and symmetric.   Psychiatric: Judgment and insight: Intact. Mood and affect: Normal.  Lymphatic: No cervical lymphadenopathy. Palpation of lymph nodes in axillae: Normal.  Palpation of lymph nodes in groin: Normal.    Lab Results   Component Value Date    WBC 6.9 06/23/2023    HGB 10.7 (L) 06/23/2023    HCT 34.7 (L) 06/23/2023     06/23/2023    CHOL 154 06/23/2023    TRIG 125 06/23/2023    HDL 55.5 06/23/2023    ALT 12 06/23/2023    AST 11 06/23/2023     06/23/2023    K 4.7 06/23/2023     06/23/2023    CREATININE 0.84 06/23/2023    BUN 10 06/23/2023    CO2 26 06/23/2023    TSH 2.07 06/23/2023    HGBA1C 5.2 08/28/2018       XR knee right 3 views, XR knee left 1-2 views  Narrative: Interpreted By:  Sabino Diaz,   STUDY:  Bilateral knee dated  10/5/2023.      INDICATION:  Signs/Symptoms:pain      COMPARISON:  Radiographs dated 10/08/2020.      ACCESSION NUMBER(S):  [id number]; [id number]      ORDERING CLINICIAN:  ANJU GRANGER      TECHNIQUE:  Four views of the bilateral knee.      FINDINGS:  No fracture or  dislocation is evident.  No knee effusion is evident.  There is mild tricompartmental degenerative change of the knees,  greatest in the right patellofemoral joint space. There is a surface  osteophyte on the right medial femoral condyle. No soft tissue gas or  radiopaque foreign body is evident.      Impression: Degenerative changes without osseous injury evident.      MACRO:  None      Signed by: Sabino Diaz 10/5/2023 2:20 PM  Dictation workstation:   [id number]      Assessment/Plan   Problem List Items Addressed This Visit       Herpes, genital     Will provide chronic suppressive therapy         Class 3 severe obesity due to excess calories with body mass index (BMI) of 40.0 to 44.9 in adult (CMS/[facility])     Encouraged weight reduction and regular exercise         Cigarette nicotine dependence without complication     Explained that one of the best ways to prevent cancer was to stop smoking.  Patient is not ready.         Anemia - Primary     Check iron and CBC  Patient wishes to be evaluated by gastroenterology         Relevant Medications    ferrous sulfate 324 mg (65 mg elemental iron) EC tablet (delayed release)    Other Relevant Orders    Referral to Gastroenterology    Chronic lower back pain     Will prescribe ibuprofen to be taken as needed as well as Voltaren gel          Other Visit Diagnoses       Yeast infection        Relevant Medications    fluconazole (Diflucan) 150 mg tablet    Health care maintenance        Relevant Medications    hydrocortisone 1 % cream    Other Relevant Orders    CBC    Comprehensive metabolic panel    Urinalysis with Reflex Microscopic    HSV infection        Relevant Medications    valACYclovir (Valtrex) 500 mg tablet    Rash        Relevant Medications    triamcinolone (Kenalog) 0.025 % ointment    Knee pain, unspecified chronicity, unspecified laterality        Relevant Medications    diclofenac sodium (Voltaren) 1 % gel gel    naproxen (Naprosyn) 500 mg tablet    Other  Relevant Orders    Referral to Physical Therapy

## 2024-04-08 ENCOUNTER — TELEMEDICINE (OUTPATIENT)
Dept: PRIMARY CARE | Facility: CLINIC | Age: 39
End: 2024-04-08
Payer: COMMERCIAL

## 2024-04-08 DIAGNOSIS — R05.8 POST-VIRAL COUGH SYNDROME: Primary | ICD-10-CM

## 2024-04-08 PROCEDURE — 99442 PR PHYS/QHP TELEPHONE EVALUATION 11-20 MIN: CPT | Performed by: INTERNAL MEDICINE

## 2024-04-08 PROCEDURE — 3008F BODY MASS INDEX DOCD: CPT | Performed by: INTERNAL MEDICINE

## 2024-04-08 PROCEDURE — 4004F PT TOBACCO SCREEN RCVD TLK: CPT | Performed by: INTERNAL MEDICINE

## 2024-04-08 RX ORDER — BENZONATATE 200 MG/1
200 CAPSULE ORAL 3 TIMES DAILY PRN
Qty: 42 CAPSULE | Refills: 0 | Status: SHIPPED | OUTPATIENT
Start: 2024-04-08 | End: 2024-05-08

## 2024-04-08 RX ORDER — METHYLPREDNISOLONE 4 MG/1
TABLET ORAL
Qty: 21 TABLET | Refills: 0 | Status: SHIPPED | OUTPATIENT
Start: 2024-04-08 | End: 2024-04-15

## 2024-04-08 NOTE — PROGRESS NOTES
Subjective   Patient ID: Albertina Dominguez is a 38 y.o. female who presents for No chief complaint on file..    HPI     I discussed with the patient the potential benefits and risks of the use of telephone or video-conferencing that differ from in-person services (e.g., limits to patient confidentiality, limitations on the provider’s ability to observe the patient, limitations on the diagnostic tools available). I explained to the patient that I may determine at any point that telehealth services are not appropriate based on the patient’s circumstances and either party may therefore end the service to schedule an alternative in-person service or contact 911 to address a medical emergency. With the understanding of these risks, benefits and alternatives, the patient agreed to use the telephone or video-conferencing platform selected for this virtual session and further the patient confirmed his/her understanding that the services do not guarantee a specific outcome or recovery.  Patient confirms they are currently physically located in the [facility] at this time.     Patient is a 38-year-old female who presents with chief complaint of ongoing cough following an upper respiratory tract infection last week.  She denies any shortness of breath or fevers.  She is been taking over-the-counter Robitussin and Tylenol without relief.  No dyspnea on exertion.  Nonproductive cough      Review of Systems:  Constitutional: No fever or chills  Cardiovascular: no chest pain, no palpitations and no syncope.   Respiratory: cough, no shortness of breath during exertion and no shortness of breath at rest.   Gastrointestinal: no abdominal pain, no nausea and no vomiting.  Neuro: No Headache, no dizziness    Physical Exam:   Unable to perform due to telephone visit    Assessment/Plan   Problem List Items Addressed This Visit    None  Visit Diagnoses         Codes    Post-viral cough syndrome    -  Primary R05.8    Relevant  Medications    methylPREDNISolone (Medrol Dospak) 4 mg tablets    benzonatate (Tessalon) 200 mg capsule    Other Relevant Orders    XR chest 2 views            1.  Likely postviral cough syndrome.  Will provide Medrol for symptomatic relief as well as Tessalon Perles to suppress the cough.  Will obtain chest x-ray.  If evidence of pneumonia will treat.  However given patient does not feel terribly ill and for the most part the symptom is coughing it is likely and probably a postviral cough syndrome which is for the most part self-limiting      This Medical recommendation has been made based on the Telephonic/Video conversation and the history is given by the patient, which I as a Physician and the patient also understand that there are limitations given the lack of an in-person Physical Exam. Patient will call back if symptoms don't improve.        A Doctor is always available by phone when the office is closed. Please feel free to call for help with any problem that you feel shouldn't wait until the office re-opens.     Topher Rob, DO

## 2024-04-26 ENCOUNTER — TELEPHONE (OUTPATIENT)
Dept: PRIMARY CARE | Facility: CLINIC | Age: 39
End: 2024-04-26
Payer: COMMERCIAL

## 2024-04-26 DIAGNOSIS — B37.31 YEAST INFECTION OF THE VAGINA: Primary | ICD-10-CM

## 2024-04-28 RX ORDER — FLUCONAZOLE 150 MG/1
150 TABLET ORAL ONCE
Qty: 1 TABLET | Refills: 0 | Status: SHIPPED | OUTPATIENT
Start: 2024-04-28 | End: 2024-04-28

## 2024-05-09 DIAGNOSIS — R21 RASH: ICD-10-CM

## 2024-05-10 RX ORDER — TRIAMCINOLONE ACETONIDE 0.25 MG/G
OINTMENT TOPICAL
Qty: 15 G | Refills: 0 | Status: SHIPPED | OUTPATIENT
Start: 2024-05-10

## 2024-06-17 ENCOUNTER — TELEPHONE (OUTPATIENT)
Dept: PRIMARY CARE | Facility: CLINIC | Age: 39
End: 2024-06-17
Payer: COMMERCIAL

## 2024-06-18 ENCOUNTER — TELEMEDICINE (OUTPATIENT)
Dept: PRIMARY CARE | Facility: CLINIC | Age: 39
End: 2024-06-18
Payer: COMMERCIAL

## 2024-06-18 DIAGNOSIS — Z20.2 TRICHOMONAS EXPOSURE: Primary | ICD-10-CM

## 2024-06-18 PROCEDURE — 99213 OFFICE O/P EST LOW 20 MIN: CPT | Performed by: INTERNAL MEDICINE

## 2024-06-18 PROCEDURE — 3008F BODY MASS INDEX DOCD: CPT | Performed by: INTERNAL MEDICINE

## 2024-06-18 PROCEDURE — 4004F PT TOBACCO SCREEN RCVD TLK: CPT | Performed by: INTERNAL MEDICINE

## 2024-06-18 RX ORDER — TINIDAZOLE 500 MG/1
2 TABLET ORAL ONCE
Qty: 4 TABLET | Refills: 0 | Status: SHIPPED | OUTPATIENT
Start: 2024-06-18 | End: 2024-06-18

## 2024-06-18 NOTE — PROGRESS NOTES
Subjective   Patient ID: Albertina Dominguez is a 38 y.o. female who presents for No chief complaint on file..    HPI     Patient is a 38-year-old female with past medical history of GERD who presents with chief complaint of trichomonas exposure.  Last exposure was 3 days ago but there has been exposure prior to that.  No symptoms.  No dysuria.  No discharge    Review of Systems:  Constitutional: No fever or chills  Cardiovascular: no chest pain, no palpitations and no syncope.   Respiratory: no cough, no shortness of breath during exertion and no shortness of breath at rest.   Gastrointestinal: no abdominal pain, no nausea and no vomiting.  Neuro: No Headache, no dizziness    Physical Exam:   Constitutional: Alert and in no acute distress. Well developed, well nourished.   Head and Face: Head and face: Normal.     Eyes: Normal external exam.    Ears, Nose, Mouth, and Throat: External inspection of ears and nose: Normal.  Hearing: Normal.   Neck: No neck mass was observed. Supple.  Pulmonary: No respiratory distress.    Musculoskeletal: Range of motion: Normal.     Skin: Normal skin color and pigmentation, normal skin turgor, and no rash.    Neurologic: Coordination: Normal.    Psychiatric: Judgment and insight: Intact. Mood and affect: Normal.    Assessment/Plan   Problem List Items Addressed This Visit    None  Visit Diagnoses         Codes    Trichomonas exposure    -  Primary Z20.2            1.  Trichomonas exposure.  Start tinidazole  2 g once.  Patient is not interested in any other STD checks.  If symptoms do not improve please follow-up in 7 days or if symptoms do develop.      This Medical recommendation has been made based on the Telephonic/Video conversation and the history is given by the patient, which I as a Physician and the patient also understand that there are limitations given the lack of an in-person Physical Exam. Patient will call back if symptoms don't improve.        A Doctor is always  available by phone when the office is closed. Please feel free to call for help with any problem that you feel shouldn't wait until the office re-opens.     Topher Rob, DO

## 2024-06-27 ENCOUNTER — APPOINTMENT (OUTPATIENT)
Dept: OBSTETRICS AND GYNECOLOGY | Facility: CLINIC | Age: 39
End: 2024-06-27
Payer: COMMERCIAL

## 2024-06-27 VITALS
HEIGHT: 66 IN | WEIGHT: 249 LBS | SYSTOLIC BLOOD PRESSURE: 134 MMHG | DIASTOLIC BLOOD PRESSURE: 83 MMHG | BODY MASS INDEX: 40.02 KG/M2

## 2024-06-27 DIAGNOSIS — D25.1 INTRAMURAL LEIOMYOMA OF UTERUS: ICD-10-CM

## 2024-06-27 DIAGNOSIS — Z11.3 ROUTINE SCREENING FOR STI (SEXUALLY TRANSMITTED INFECTION): Primary | ICD-10-CM

## 2024-06-27 DIAGNOSIS — N93.8 DYSFUNCTIONAL UTERINE BLEEDING: ICD-10-CM

## 2024-06-27 DIAGNOSIS — Z01.419 NORMAL GYNECOLOGIC EXAMINATION: ICD-10-CM

## 2024-06-27 PROCEDURE — 99204 OFFICE O/P NEW MOD 45 MIN: CPT | Performed by: OBSTETRICS & GYNECOLOGY

## 2024-06-27 PROCEDURE — 87491 CHLMYD TRACH DNA AMP PROBE: CPT

## 2024-06-27 PROCEDURE — 87591 N.GONORRHOEAE DNA AMP PROB: CPT

## 2024-06-27 PROCEDURE — 3008F BODY MASS INDEX DOCD: CPT | Performed by: OBSTETRICS & GYNECOLOGY

## 2024-06-27 PROCEDURE — 4004F PT TOBACCO SCREEN RCVD TLK: CPT | Performed by: OBSTETRICS & GYNECOLOGY

## 2024-06-27 RX ORDER — IBUPROFEN 800 MG/1
TABLET ORAL
COMMUNITY
Start: 2024-06-25

## 2024-06-27 RX ORDER — LIDOCAINE 560 MG/1
1 PATCH PERCUTANEOUS; TOPICAL; TRANSDERMAL
COMMUNITY
Start: 2024-06-24 | End: 2024-06-29

## 2024-06-27 RX ORDER — ACETAMINOPHEN 500 MG
500-1000 TABLET ORAL EVERY 6 HOURS PRN
COMMUNITY
Start: 2024-06-24

## 2024-06-27 ASSESSMENT — ENCOUNTER SYMPTOMS
MUSCULOSKELETAL NEGATIVE: 1
EYES NEGATIVE: 1
NEUROLOGICAL NEGATIVE: 1
HEMATOLOGIC/LYMPHATIC NEGATIVE: 1
PSYCHIATRIC NEGATIVE: 1
ALLERGIC/IMMUNOLOGIC NEGATIVE: 1
CARDIOVASCULAR NEGATIVE: 1
RESPIRATORY NEGATIVE: 1
GASTROINTESTINAL NEGATIVE: 1
ENDOCRINE NEGATIVE: 1
CONSTITUTIONAL NEGATIVE: 1

## 2024-06-27 ASSESSMENT — PAIN SCALES - GENERAL: PAINLEVEL: 0-NO PAIN

## 2024-06-27 NOTE — PROGRESS NOTES
Subjective   Patient ID: Albertina Dominguez is a 38 y.o. female who presents for New Patient Visit (New patient is here for abdominal pain and bleeding twice a month./Last pap:  2023  neg/neg/Last doris:  2023  cat / chaperone.   Cassie Abrams LPN).  HPI patient is here to establish care she is 38-year-old female  4 para 1 last menstrual period 2024 menarche at 15.  Twice a month for 4 days quite painful patient also has some irregular bleeding patient does not use contraception Pap test  negative negative mammogram  negative patient smokes pot 3-4 times a day drinks alcohol occasionally does not use her drugs she takes vitamins she is allergic to codeine Benadryl and morphine past medical history negative past surgical history significant for right knee surgery family history positive for lymphoma stomach cancer hypertension coronary heart disease    Review of Systems   Constitutional: Negative.    Eyes: Negative.    Respiratory: Negative.     Cardiovascular: Negative.    Gastrointestinal: Negative.    Endocrine: Negative.    Genitourinary: Negative.    Musculoskeletal: Negative.    Skin: Negative.    Allergic/Immunologic: Negative.    Neurological: Negative.    Hematological: Negative.    Psychiatric/Behavioral: Negative.           Objective   Physical Exam  Constitutional:       Appearance: Normal appearance. She is obese.   HENT:      Head: Normocephalic and atraumatic.   Cardiovascular:      Rate and Rhythm: Normal rate and regular rhythm.      Pulses: Normal pulses.      Heart sounds: Normal heart sounds.   Pulmonary:      Effort: Pulmonary effort is normal.      Breath sounds: Normal breath sounds.   Abdominal:      General: Abdomen is flat. Bowel sounds are normal.      Palpations: Abdomen is soft.      Hernia: There is no hernia in the left inguinal area or right inguinal area.   Genitourinary:     General: Normal vulva.      Exam position: Lithotomy position.       Labia:         Right: No rash, tenderness or lesion.         Left: No rash, tenderness or lesion.       Urethra: No prolapse.      Vagina: Normal.      Cervix: Normal.      Uterus: Normal. Enlarged.       Adnexa: Right adnexa normal and left adnexa normal.      Comments: Uterus irregular and enlarged tender to palpation also there is tenderness in the adnexal region but to milder degree  Musculoskeletal:      Cervical back: Normal range of motion and neck supple.   Skin:     General: Skin is warm and dry.   Neurological:      General: No focal deficit present.      Mental Status: She is alert and oriented to person, place, and time.     Albertina Dominguez presents for a pelvic ultrasound examination using real time transvaginal scan technique.     Uterus:  The uterus was well visualized, midline and anteverted in orientation, with a symmetric shape, and a abnormal size measuring 4.8x 8.2 x 6.9 cm.  The myometrium appeared  abnormal with complaints of anterior uterine fibroid . The endometrium had a normal and 0.46 cm  lining and empty cavity.     Right Ovary: the right ovary  contains resolving cystic mass measuring 3.6 x 2.2 x 3.3 cm    Left Ovary: the left ovary appeared normal in size, shape and orientation and echogenicity measuring 3.2 x 1.5 x 3.2 cm    Adnexa: the adnexa appears normal bilaterally    Cul-de-sac: there is no evidence of posterior cul-de-sac fluid    Impression:  Uterine fibroids resolving cystic mass on the right ovary    Humberto Euceda MD        Assessment/Plan    uterine fibroids  Resolving cystic mass in the right         Humberto Euceda MD 06/27/24 1:33 PM

## 2024-06-28 LAB
C TRACH RRNA SPEC QL NAA+PROBE: NEGATIVE
N GONORRHOEA DNA SPEC QL PROBE+SIG AMP: NEGATIVE

## 2024-09-19 ENCOUNTER — APPOINTMENT (OUTPATIENT)
Dept: PRIMARY CARE | Facility: CLINIC | Age: 39
End: 2024-09-19
Payer: COMMERCIAL

## 2024-09-19 ENCOUNTER — LAB (OUTPATIENT)
Dept: LAB | Facility: LAB | Age: 39
End: 2024-09-19
Payer: COMMERCIAL

## 2024-09-19 VITALS
BODY MASS INDEX: 41.3 KG/M2 | DIASTOLIC BLOOD PRESSURE: 78 MMHG | OXYGEN SATURATION: 96 % | HEIGHT: 66 IN | HEART RATE: 64 BPM | WEIGHT: 257 LBS | SYSTOLIC BLOOD PRESSURE: 128 MMHG

## 2024-09-19 DIAGNOSIS — Z00.00 HEALTH CARE MAINTENANCE: ICD-10-CM

## 2024-09-19 DIAGNOSIS — R21 RASH: ICD-10-CM

## 2024-09-19 DIAGNOSIS — K21.9 LARYNGOPHARYNGEAL REFLUX: ICD-10-CM

## 2024-09-19 DIAGNOSIS — D64.9 ANEMIA, UNSPECIFIED TYPE: ICD-10-CM

## 2024-09-19 DIAGNOSIS — M67.90: ICD-10-CM

## 2024-09-19 DIAGNOSIS — D64.9 ANEMIA, UNSPECIFIED TYPE: Primary | ICD-10-CM

## 2024-09-19 LAB
ALBUMIN SERPL BCP-MCNC: 4 G/DL (ref 3.4–5)
ALP SERPL-CCNC: 56 U/L (ref 33–110)
ALT SERPL W P-5'-P-CCNC: 10 U/L (ref 7–45)
ANION GAP SERPL CALC-SCNC: <7 MMOL/L (ref 10–20)
AST SERPL W P-5'-P-CCNC: 12 U/L (ref 9–39)
BILIRUB SERPL-MCNC: 0.3 MG/DL (ref 0–1.2)
BUN SERPL-MCNC: 11 MG/DL (ref 6–23)
CALCIUM SERPL-MCNC: 9.1 MG/DL (ref 8.6–10.6)
CHLORIDE SERPL-SCNC: 106 MMOL/L (ref 98–107)
CHOLEST SERPL-MCNC: 187 MG/DL (ref 0–199)
CHOLESTEROL/HDL RATIO: 3.2
CO2 SERPL-SCNC: 32 MMOL/L (ref 21–32)
CREAT SERPL-MCNC: 0.87 MG/DL (ref 0.5–1.05)
EGFRCR SERPLBLD CKD-EPI 2021: 88 ML/MIN/1.73M*2
ERYTHROCYTE [DISTWIDTH] IN BLOOD BY AUTOMATED COUNT: 15.9 % (ref 11.5–14.5)
GLUCOSE SERPL-MCNC: 71 MG/DL (ref 74–99)
HCT VFR BLD AUTO: 34 % (ref 36–46)
HDLC SERPL-MCNC: 58.1 MG/DL
HGB BLD-MCNC: 10.5 G/DL (ref 12–16)
IRON SATN MFR SERPL: 9 % (ref 25–45)
IRON SERPL-MCNC: 39 UG/DL (ref 35–150)
LDLC SERPL CALC-MCNC: 114 MG/DL
MCH RBC QN AUTO: 27.1 PG (ref 26–34)
MCHC RBC AUTO-ENTMCNC: 30.9 G/DL (ref 32–36)
MCV RBC AUTO: 88 FL (ref 80–100)
NON HDL CHOLESTEROL: 129 MG/DL (ref 0–149)
NRBC BLD-RTO: 0 /100 WBCS (ref 0–0)
PLATELET # BLD AUTO: 378 X10*3/UL (ref 150–450)
POTASSIUM SERPL-SCNC: 4.3 MMOL/L (ref 3.5–5.3)
PROT SERPL-MCNC: 7 G/DL (ref 6.4–8.2)
RBC # BLD AUTO: 3.88 X10*6/UL (ref 4–5.2)
SODIUM SERPL-SCNC: 140 MMOL/L (ref 136–145)
TIBC SERPL-MCNC: 421 UG/DL (ref 240–445)
TRIGL SERPL-MCNC: 74 MG/DL (ref 0–149)
UIBC SERPL-MCNC: 382 UG/DL (ref 110–370)
VIT B12 SERPL-MCNC: 605 PG/ML (ref 211–911)
VLDL: 15 MG/DL (ref 0–40)
WBC # BLD AUTO: 7.2 X10*3/UL (ref 4.4–11.3)

## 2024-09-19 PROCEDURE — 80061 LIPID PANEL: CPT

## 2024-09-19 PROCEDURE — 82607 VITAMIN B-12: CPT

## 2024-09-19 PROCEDURE — 83550 IRON BINDING TEST: CPT

## 2024-09-19 PROCEDURE — 85027 COMPLETE CBC AUTOMATED: CPT

## 2024-09-19 PROCEDURE — 36415 COLL VENOUS BLD VENIPUNCTURE: CPT

## 2024-09-19 PROCEDURE — 4004F PT TOBACCO SCREEN RCVD TLK: CPT | Performed by: INTERNAL MEDICINE

## 2024-09-19 PROCEDURE — 3008F BODY MASS INDEX DOCD: CPT | Performed by: INTERNAL MEDICINE

## 2024-09-19 PROCEDURE — 99213 OFFICE O/P EST LOW 20 MIN: CPT | Performed by: INTERNAL MEDICINE

## 2024-09-19 PROCEDURE — 80053 COMPREHEN METABOLIC PANEL: CPT

## 2024-09-19 PROCEDURE — 83540 ASSAY OF IRON: CPT

## 2024-09-19 RX ORDER — HYDROCORTISONE 25 MG/G
CREAM TOPICAL 2 TIMES DAILY PRN
Qty: 30 G | Refills: 2 | Status: SHIPPED | OUTPATIENT
Start: 2024-09-19 | End: 2025-09-19

## 2024-09-19 RX ORDER — OMEPRAZOLE 40 MG/1
40 CAPSULE, DELAYED RELEASE ORAL DAILY
Qty: 90 CAPSULE | Refills: 1 | Status: SHIPPED | OUTPATIENT
Start: 2024-09-19

## 2024-09-19 RX ORDER — TRIAMCINOLONE ACETONIDE 0.25 MG/G
OINTMENT TOPICAL 2 TIMES DAILY
Qty: 15 G | Refills: 0 | Status: SHIPPED | OUTPATIENT
Start: 2024-09-19

## 2024-09-19 RX ORDER — IBUPROFEN 800 MG/1
800 TABLET ORAL 3 TIMES DAILY PRN
Qty: 90 TABLET | Refills: 0 | Status: SHIPPED | OUTPATIENT
Start: 2024-09-19

## 2024-09-19 NOTE — ASSESSMENT & PLAN NOTE
Continue with physical therapy.  Ibuprofen as needed  Encourage stretches and exercise and avoid heavy lifting

## 2024-09-19 NOTE — PROGRESS NOTES
"Subjective   Patient ID: Albertina Dominguez is a 38 y.o. female who presents for Annual Exam.    HPI     Patient is a 38-year-old female with past medical history of obesity nicotine dependence chronic lower back pain who presents for follow-up.  Patient was seen in the emergency room after sustaining a fall.  She continues to have lower back pain and is following with physical therapy.  She had some MRI imaging of the pelvis showing fibroids and had adnexal cyst.  She is concerned because she was under the impression that this could be cancer.  She reports that she does have some irregular menses.  She does have a gynecologist.      Review of Systems  Constitutional: No fever or chills  Cardiovascular: no chest pain, no palpitations and no syncope.   Respiratory: no cough, no shortness of breath during exertion and no shortness of breath at rest.   Gastrointestinal: no abdominal pain, no nausea and no vomiting.  Neuro: No Headache, no dizziness    Objective   /78   Pulse 64   Ht 1.676 m (5' 6\")   Wt 117 kg (257 lb)   SpO2 96%   BMI 41.48 kg/m²     Physical Exam  Constitutional: Alert and in no acute distress. Well developed, well nourished  Head and Face: Head and face: Normal.    Cardiovascular: Heart rate and rhythm were normal, normal S1 and S2. No peripheral edema.   Pulmonary: No respiratory distress. Clear bilateral breath sounds.  Musculoskeletal: Gait and station: Normal. Muscle strength/tone: Normal.   Skin: Normal skin color and pigmentation, normal skin turgor, and no rash.    Psychiatric: Judgment and insight: Intact. Mood and affect: Normal.    Procedures    Lab Results   Component Value Date    WBC 7.6 01/15/2024    HGB 11.0 (L) 01/15/2024    HCT 34.7 (L) 01/15/2024     01/15/2024    CHOL 154 06/23/2023    TRIG 125 06/23/2023    HDL 55.5 06/23/2023    ALT 9 01/15/2024    AST 8 (L) 01/15/2024     01/15/2024    K 4.1 01/15/2024     01/15/2024    CREATININE 0.76 01/15/2024 "    BUN 7 01/15/2024    CO2 28 01/15/2024    TSH 2.07 06/23/2023    HGBA1C 5.2 08/28/2018       XR knee right 3 views, XR knee left 1-2 views  Narrative: Interpreted By:  Sabino Diaz,   STUDY:  Bilateral knee dated  10/5/2023.      INDICATION:  Signs/Symptoms:pain      COMPARISON:  Radiographs dated 10/08/2020.      ACCESSION NUMBER(S):  [id number]; [id number]      ORDERING CLINICIAN:  ANJU GRANGER      TECHNIQUE:  Four views of the bilateral knee.      FINDINGS:  No fracture or dislocation is evident.  No knee effusion is evident.  There is mild tricompartmental degenerative change of the knees,  greatest in the right patellofemoral joint space. There is a surface  osteophyte on the right medial femoral condyle. No soft tissue gas or  radiopaque foreign body is evident.      Impression: Degenerative changes without osseous injury evident.      MACRO:  None      Signed by: Sabino Diaz 10/5/2023 2:20 PM  Dictation workstation:   [id number]            Assessment/Plan   Problem List Items Addressed This Visit             ICD-10-CM    Anemia - Primary D64.9     Follow-up with gynecologist to discuss the uterine fibroids.  Check iron level and CBC         Relevant Orders    CBC    Iron and TIBC    Generalized tendinopathy M67.90     Continue with physical therapy.  Ibuprofen as needed  Encourage stretches and exercise and avoid heavy lifting         Relevant Medications    ibuprofen 800 mg tablet     Other Visit Diagnoses         Codes    Rash     R21    Relevant Medications    triamcinolone (Kenalog) 0.025 % ointment    hydrocortisone 2.5 % cream    Health care maintenance     Z00.00    Relevant Medications    prenatal vitamin, iron-folic, 27 mg iron-800 mcg folic acid tablet    Other Relevant Orders    Lipid Panel    Comprehensive metabolic panel    Vitamin B12    Laryngopharyngeal reflux     K21.9    Relevant Medications    omeprazole (PriLOSEC) 40 mg DR capsule

## 2025-01-14 ENCOUNTER — APPOINTMENT (OUTPATIENT)
Dept: PRIMARY CARE | Facility: CLINIC | Age: 40
End: 2025-01-14
Payer: COMMERCIAL

## 2025-01-14 DIAGNOSIS — R09.81 NASAL CONGESTION: Primary | ICD-10-CM

## 2025-01-14 DIAGNOSIS — R21 RASH: ICD-10-CM

## 2025-01-14 DIAGNOSIS — R05.9 COUGH, UNSPECIFIED TYPE: ICD-10-CM

## 2025-01-14 PROCEDURE — 1036F TOBACCO NON-USER: CPT | Performed by: INTERNAL MEDICINE

## 2025-01-14 PROCEDURE — 99213 OFFICE O/P EST LOW 20 MIN: CPT | Performed by: INTERNAL MEDICINE

## 2025-01-14 RX ORDER — TRIAMCINOLONE ACETONIDE 0.25 MG/G
OINTMENT TOPICAL 2 TIMES DAILY
Qty: 454 G | Refills: 0 | Status: SHIPPED | OUTPATIENT
Start: 2025-01-14 | End: 2025-01-16 | Stop reason: SDUPTHER

## 2025-01-14 RX ORDER — IPRATROPIUM BROMIDE 21 UG/1
2 SPRAY, METERED NASAL EVERY 12 HOURS
Qty: 30 ML | Refills: 12 | Status: SHIPPED | OUTPATIENT
Start: 2025-01-14 | End: 2025-01-16 | Stop reason: SDUPTHER

## 2025-01-14 NOTE — PROGRESS NOTES
Subjective   Patient ID: Albertina Dominguez is a 39 y.o. female who presents for No chief complaint on file..      I discussed with the patient the potential benefits and risks of the use of telephone or video-conferencing that differ from in-person services (e.g., limits to patient confidentiality, limitations on the provider’s ability to observe the patient, limitations on the diagnostic tools available). I explained to the patient that I may determine at any point that telehealth services are not appropriate based on the patient’s circumstances and either party may therefore end the service to schedule an alternative in-person service or contact 911 to address a medical emergency. With the understanding of these risks, benefits and alternatives, the patient agreed to use the telephone or video-conferencing platform selected for this virtual session and further the patient confirmed his/her understanding that the services do not guarantee a specific outcome or recovery.  Patient confirms they are currently physically located in the [facility] at this time.     HPI     Patient is a 39-year-old female who presents with multiple complaints.  She has been experiencing some sore throat and nasal congestion since a diagnosis of acute pharyngitis started on amoxicillin 2 weeks ago.  She completed the amoxicillin less than 1 week ago.  She still reports some ongoing sore throat but no sinus pain.  No fevers or chills.    She also needs refills of her triamcinolone    Review of Systems:  Constitutional: No fever or chills  Cardiovascular: no chest pain, no palpitations and no syncope.   Respiratory: no cough, no shortness of breath during exertion and no shortness of breath at rest.   Gastrointestinal: no abdominal pain, no nausea and no vomiting.  Neuro: No Headache, no dizziness    Physical Exam:   Constitutional: Alert and in no acute distress. Well developed, well nourished.   Head and Face: Head and face: Normal.      Eyes: Normal external exam.    Ears, Nose, Mouth, and Throat: External inspection of ears and nose: Normal.  Hearing: Normal.   Neck: No neck mass was observed. Supple.  Pulmonary: No respiratory distress.    Musculoskeletal: Range of motion: Normal.     Skin: Normal skin color and pigmentation, normal skin turgor, and no rash.    Neurologic: Coordination: Normal.    Psychiatric: Judgment and insight: Intact. Mood and affect: Normal.    Assessment/Plan   Problem List Items Addressed This Visit    None  Visit Diagnoses         Codes    Nasal congestion    -  Primary R09.81    Relevant Medications    ipratropium (Atrovent) 21 mcg (0.03 %) nasal spray    Other Relevant Orders    Referral to ENT    Rash     R21    Relevant Medications    triamcinolone (Kenalog) 0.025 % ointment    Cough, unspecified type     R05.9    Relevant Orders    XR chest 2 views            1.  No evidence of tonsillar exudate based on imaging.  Will order chest x-ray given ongoing cough.  Start ipratropium bromide and start Flonase over-the-counter twice daily.  Follow-up if no improvement in 3 to 5 days      This Medical recommendation has been made based on the Telephonic/Video conversation and the history is given by the patient, which I as a Physician and the patient also understand that there are limitations given the lack of an in-person Physical Exam. Patient will call back if symptoms don't improve.        A Doctor is always available by phone when the office is closed. Please feel free to call for help with any problem that you feel shouldn't wait until the office re-opens.     Topher Rob, DO

## 2025-01-16 DIAGNOSIS — R09.81 NASAL CONGESTION: ICD-10-CM

## 2025-01-16 DIAGNOSIS — R21 RASH: ICD-10-CM

## 2025-01-17 RX ORDER — TRIAMCINOLONE ACETONIDE 0.25 MG/G
OINTMENT TOPICAL 2 TIMES DAILY
Qty: 454 G | Refills: 0 | Status: SHIPPED | OUTPATIENT
Start: 2025-01-17

## 2025-01-17 RX ORDER — IPRATROPIUM BROMIDE 21 UG/1
2 SPRAY, METERED NASAL EVERY 12 HOURS
Qty: 30 ML | Refills: 12 | Status: SHIPPED | OUTPATIENT
Start: 2025-01-17 | End: 2026-01-17

## 2025-02-12 ENCOUNTER — TELEMEDICINE (OUTPATIENT)
Dept: PRIMARY CARE | Facility: CLINIC | Age: 40
End: 2025-02-12
Payer: COMMERCIAL

## 2025-02-12 DIAGNOSIS — B37.9 YEAST INFECTION: Primary | ICD-10-CM

## 2025-02-12 RX ORDER — FLUCONAZOLE 150 MG/1
150 TABLET ORAL ONCE
Qty: 1 TABLET | Refills: 1 | Status: SHIPPED | OUTPATIENT
Start: 2025-02-12 | End: 2025-02-12

## 2025-02-12 RX ORDER — FLUCONAZOLE 150 MG/1
150 TABLET ORAL ONCE
Qty: 1 TABLET | Refills: 0 | Status: SHIPPED | OUTPATIENT
Start: 2025-02-12 | End: 2025-02-12

## 2025-02-12 NOTE — PROGRESS NOTES
Please return to the ED if you have any abdominal pain, vaginal bleeding, worsening nausea or vomiting, or any other worsening/concerning symptoms.    Subjective   Patient ID: Albertina Dominguez is a 39 y.o. female who presents for No chief complaint on file..    I discussed with the patient the potential benefits and risks of the use of telephone or video-conferencing that differ from in-person services (e.g., limits to patient confidentiality, limitations on the provider’s ability to observe the patient, limitations on the diagnostic tools available). I explained to the patient that I may determine at any point that telehealth services are not appropriate based on the patient’s circumstances and either party may therefore end the service to schedule an alternative in-person service or contact 911 to address a medical emergency. With the understanding of these risks, benefits and alternatives, the patient agreed to use the telephone or video-conferencing platform selected for this virtual session and further the patient confirmed his/her understanding that the services do not guarantee a specific outcome or recovery.  Patient confirms they are currently physically located in the [facility] at this time.     HPI     Patient is a 39-year-old female who presents with chief complaint of vaginal itching with white discharge without fevers back pain or blood in the urine.  No recent antibiotic use and patient is not a diabetic.  She states that she changed her detergents and feels like it contributed to her current symptoms    Review of Systems:  Constitutional: No fever or chills  Cardiovascular: no chest pain, no palpitations and no syncope.   Respiratory: no cough, no shortness of breath during exertion and no shortness of breath at rest.   Gastrointestinal: no abdominal pain, no nausea and no vomiting.  Neuro: No Headache, no dizziness    Physical Exam:   Telephone visit    Assessment/Plan   Problem List Items Addressed This Visit    None  Visit Diagnoses         Codes    Yeast infection    -  Primary B37.9    Relevant Medications    fluconazole (Diflucan) 150  mg tablet            1.  Concern for yeast infection.  Will prescribe Diflucan 150 mg once.  Follow-up in 3 to 5 days if symptoms do not improve      This Medical recommendation has been made based on the Telephonic/Video conversation and the history is given by the patient, which I as a Physician and the patient also understand that there are limitations given the lack of an in-person Physical Exam. Patient will call back if symptoms don't improve.        A Doctor is always available by phone when the office is closed. Please feel free to call for help with any problem that you feel shouldn't wait until the office re-opens.     Topher Rob, DO

## 2025-06-06 ENCOUNTER — APPOINTMENT (OUTPATIENT)
Dept: PRIMARY CARE | Facility: CLINIC | Age: 40
End: 2025-06-06
Payer: COMMERCIAL

## 2025-06-13 ENCOUNTER — HOSPITAL ENCOUNTER (OUTPATIENT)
Dept: RADIOLOGY | Facility: CLINIC | Age: 40
Discharge: HOME | End: 2025-06-13
Payer: COMMERCIAL

## 2025-06-13 ENCOUNTER — OFFICE VISIT (OUTPATIENT)
Dept: PRIMARY CARE | Facility: CLINIC | Age: 40
End: 2025-06-13
Payer: COMMERCIAL

## 2025-06-13 VITALS
SYSTOLIC BLOOD PRESSURE: 126 MMHG | BODY MASS INDEX: 40.03 KG/M2 | WEIGHT: 248 LBS | OXYGEN SATURATION: 97 % | HEART RATE: 55 BPM | DIASTOLIC BLOOD PRESSURE: 85 MMHG

## 2025-06-13 DIAGNOSIS — M79.605 PAIN OF LEFT LOWER EXTREMITY: Primary | ICD-10-CM

## 2025-06-13 DIAGNOSIS — R21 RASH: ICD-10-CM

## 2025-06-13 DIAGNOSIS — M67.90: ICD-10-CM

## 2025-06-13 DIAGNOSIS — R07.89 ANTERIOR CHEST WALL PAIN: ICD-10-CM

## 2025-06-13 DIAGNOSIS — N64.4 BREAST PAIN: ICD-10-CM

## 2025-06-13 DIAGNOSIS — G83.10 PARESIS OF SINGLE LOWER EXTREMITY: ICD-10-CM

## 2025-06-13 DIAGNOSIS — M79.605 PAIN OF LEFT LOWER EXTREMITY: ICD-10-CM

## 2025-06-13 PROCEDURE — 71100 X-RAY EXAM RIBS UNI 2 VIEWS: CPT | Mod: LT

## 2025-06-13 PROCEDURE — 99213 OFFICE O/P EST LOW 20 MIN: CPT | Performed by: INTERNAL MEDICINE

## 2025-06-13 PROCEDURE — 72100 X-RAY EXAM L-S SPINE 2/3 VWS: CPT

## 2025-06-13 PROCEDURE — 1036F TOBACCO NON-USER: CPT | Performed by: INTERNAL MEDICINE

## 2025-06-13 RX ORDER — IBUPROFEN 800 MG/1
800 TABLET, FILM COATED ORAL 3 TIMES DAILY PRN
Qty: 90 TABLET | Refills: 0 | Status: SHIPPED | OUTPATIENT
Start: 2025-06-13

## 2025-06-13 RX ORDER — TRIAMCINOLONE ACETONIDE 0.25 MG/G
OINTMENT TOPICAL 2 TIMES DAILY
Qty: 454 G | Refills: 0 | Status: SHIPPED | OUTPATIENT
Start: 2025-06-13

## 2025-06-13 NOTE — PROGRESS NOTES
Subjective   Patient ID: Albertina Dominguez is a 39 y.o. female who presents for Follow-up, Back Pain, and Breast Pain.    HPI     Patient is a 39-year-old female who presents with chief complaint of anterior chest pain and lower back pain.  Of note patient has been to [employer] for lower back pain after sustaining a fall in the job.  MRI show some arthritis of the lumbar spine and she has been to physical therapy.  She continues to have lower back pain and is wondering if she can get a second opinion regarding the lower back pain.    She is also experiencing some anterior chest pain.  There is reproducible chest pain to palpation of the anterior thorax superior to the breast tissue.  She also reports some breast pain as well.  Symptom onset 2 weeks ago    Review of Systems  Constitutional: No fever or chills  Cardiovascular: no chest pain, no palpitations and no syncope.   Respiratory: no cough, no shortness of breath during exertion and no shortness of breath at rest.   Gastrointestinal: no abdominal pain, no nausea and no vomiting.  Neuro: No Headache, no dizziness    Objective   /85   Pulse 55   Wt 112 kg (248 lb)   SpO2 97%   BMI 40.03 kg/m²     Physical Exam  Constitutional: Alert and in no acute distress. Well developed, well nourished  Head and Face: Head and face: Normal.    Cardiovascular: Heart rate and rhythm were normal, normal S1 and S2. No peripheral edema.   Pulmonary: No respiratory distress. Clear bilateral breath sounds.  Musculoskeletal: Gait and station: Normal. Muscle strength/tone: Normal.   Skin: Normal skin color and pigmentation, normal skin turgor, and no rash.    Psychiatric: Judgment and insight: Intact. Mood and affect: Normal.    Procedures    Lab Results   Component Value Date    WBC 7.2 09/19/2024    HGB 10.5 (L) 09/19/2024    HCT 34.0 (L) 09/19/2024     09/19/2024    CHOL 187 09/19/2024    TRIG 74 09/19/2024    HDL 58.1 09/19/2024    ALT 10 09/19/2024     AST 12 09/19/2024     09/19/2024    K 4.3 09/19/2024     09/19/2024    CREATININE 0.87 09/19/2024    BUN 11 09/19/2024    CO2 32 09/19/2024    TSH 2.07 06/23/2023    HGBA1C 5.2 08/28/2018       XR knee right 3 views, XR knee left 1-2 views  Narrative: Interpreted By:  Sabino Diaz,   STUDY:  Bilateral knee dated  10/5/2023.      INDICATION:  Signs/Symptoms:pain      COMPARISON:  Radiographs dated 10/08/2020.      ACCESSION NUMBER(S):  [id number]; [id number]      ORDERING CLINICIAN:  ANJU GRANGER      TECHNIQUE:  Four views of the bilateral knee.      FINDINGS:  No fracture or dislocation is evident.  No knee effusion is evident.  There is mild tricompartmental degenerative change of the knees,  greatest in the right patellofemoral joint space. There is a surface  osteophyte on the right medial femoral condyle. No soft tissue gas or  radiopaque foreign body is evident.      Impression: Degenerative changes without osseous injury evident.      MACRO:  None      Signed by: Sabino Diaz 10/5/2023 2:20 PM  Dictation workstation:   [id number]            Assessment/Plan   Problem List Items Addressed This Visit           ICD-10-CM    Generalized tendinopathy M67.90    Relevant Medications    ibuprofen 800 mg tablet     Other Visit Diagnoses         Codes      Pain of left lower extremity    -  Primary M79.605    Relevant Orders    Referral to Physical Medicine Rehab    XR lumbar spine 2-3 views      Anterior chest wall pain     R07.89    Relevant Orders    XR ribs left 2 views      Breast pain     N64.4    Relevant Orders    BI US breast complete left      Rash     R21    Relevant Medications    triamcinolone (Kenalog) 0.025 % ointment        Refill Naprosyn.  Will obtain ultrasound of the breast to evaluate the pain.  Check images of the left ribs and the lumbar spine.  Referral to physical medicine reveal Tatian to discuss ongoing chronic pain affecting multiple areas of the body that are  likely not related to arthritis and to determine whether or not the findings on the MRI lumbar imaging are consistent with some of her pains

## 2025-06-16 DIAGNOSIS — N64.4 BREAST PAIN: Primary | ICD-10-CM

## 2025-06-16 DIAGNOSIS — M54.50 LOW BACK PAIN, UNSPECIFIED BACK PAIN LATERALITY, UNSPECIFIED CHRONICITY, UNSPECIFIED WHETHER SCIATICA PRESENT: ICD-10-CM

## 2025-06-17 ENCOUNTER — TELEPHONE (OUTPATIENT)
Dept: PRIMARY CARE | Facility: CLINIC | Age: 40
End: 2025-06-17
Payer: COMMERCIAL

## 2025-06-19 ENCOUNTER — TELEMEDICINE (OUTPATIENT)
Dept: PRIMARY CARE | Facility: CLINIC | Age: 40
End: 2025-06-19
Payer: COMMERCIAL

## 2025-06-19 DIAGNOSIS — M54.50 LOW BACK PAIN, UNSPECIFIED BACK PAIN LATERALITY, UNSPECIFIED CHRONICITY, UNSPECIFIED WHETHER SCIATICA PRESENT: Primary | ICD-10-CM

## 2025-06-19 DIAGNOSIS — M54.50 ACUTE MIDLINE LOW BACK PAIN WITHOUT SCIATICA: ICD-10-CM

## 2025-06-19 PROCEDURE — 1036F TOBACCO NON-USER: CPT | Performed by: INTERNAL MEDICINE

## 2025-06-19 PROCEDURE — 99213 OFFICE O/P EST LOW 20 MIN: CPT | Performed by: INTERNAL MEDICINE

## 2025-06-19 NOTE — PROGRESS NOTES
Subjective   Patient ID: Albertina Dominguez is a 39 y.o. female who presents for No chief complaint on file..    Virtual or Telephone Consent    An interactive audio and video telecommunication system which permits real time communications between the patient (at the originating site) and provider (at the distant site) was utilized to provide this telehealth service.   Verbal consent was requested and obtained from Albertina Dominguez on this date, 06/19/25 for a telehealth visit and the patient's location was confirmed at the time of the visit.      HPI     Patient is a 39-year-old female who presents as follow-up from recent imaging.  Lumbar imaging did not show any acute abnormalities.  MRI done recently did not show any bony abnormalities either or soft tissue abnormalities.  Patient states the pain is obstructive for her everyday functioning.  She cannot sleep on the right side of her body.  No alleviating factors    Review of Systems:  Constitutional: No fever or chills  Cardiovascular: no chest pain, no palpitations and no syncope.   Respiratory: no cough, no shortness of breath during exertion and no shortness of breath at rest.   Gastrointestinal: no abdominal pain, no nausea and no vomiting.  MSK lower back pain    Physical Exam:   Constitutional: Alert and in no acute distress. Well developed, well nourished.   Head and Face: Head and face: Normal.     Eyes: Normal external exam.    Ears, Nose, Mouth, and Throat: External inspection of ears and nose: Normal.  Hearing: Normal.   Neck: No neck mass was observed. Supple.  Pulmonary: No respiratory distress.    Musculoskeletal: Range of motion: Normal.     Skin: Normal skin color and pigmentation, normal skin turgor, and no rash.    Neurologic: Coordination: Normal.    Psychiatric: Judgment and insight: Intact. Mood and affect: Normal.    Assessment/Plan   Problem List Items Addressed This Visit           ICD-10-CM    Acute midline low back pain without  sciatica M54.50     Other Visit Diagnoses         Codes      Low back pain, unspecified back pain laterality, unspecified chronicity, unspecified whether sciatica present    -  Primary M54.50            1.       This Medical recommendation has been made based on the Telephonic/Video conversation and the history is given by the patient, which I as a Physician and the patient also understand that there are limitations given the lack of an in-person Physical Exam. Patient will call back if symptoms don't improve.        A Doctor is always available by phone when the office is closed. Please feel free to call for help with any problem that you feel shouldn't wait until the office re-opens.     Topher Rob, DO

## 2025-06-19 NOTE — ASSESSMENT & PLAN NOTE
X-ray shows no concerning features and MRI did not show any lumbar canal stenosis.  At this point lower back pain likely musculoskeletal.  Referral to physical therapy and physical medicine and rehabilitation.  Continue ibuprofen and stretches as recommended.

## 2025-07-17 ENCOUNTER — OFFICE VISIT (OUTPATIENT)
Dept: PRIMARY CARE | Facility: CLINIC | Age: 40
End: 2025-07-17
Payer: COMMERCIAL

## 2025-07-17 VITALS
BODY MASS INDEX: 39.06 KG/M2 | WEIGHT: 242 LBS | OXYGEN SATURATION: 98 % | SYSTOLIC BLOOD PRESSURE: 116 MMHG | HEART RATE: 60 BPM | DIASTOLIC BLOOD PRESSURE: 78 MMHG

## 2025-07-17 DIAGNOSIS — R10.9 FLANK PAIN: Primary | ICD-10-CM

## 2025-07-17 DIAGNOSIS — B37.31 VAGINAL YEAST INFECTION: ICD-10-CM

## 2025-07-17 DIAGNOSIS — G62.89 OTHER POLYNEUROPATHY: ICD-10-CM

## 2025-07-17 DIAGNOSIS — M67.90: ICD-10-CM

## 2025-07-17 PROCEDURE — 99214 OFFICE O/P EST MOD 30 MIN: CPT | Performed by: INTERNAL MEDICINE

## 2025-07-17 RX ORDER — IBUPROFEN 800 MG/1
800 TABLET, FILM COATED ORAL 3 TIMES DAILY PRN
Qty: 90 TABLET | Refills: 0 | Status: SHIPPED | OUTPATIENT
Start: 2025-07-17

## 2025-07-17 RX ORDER — FLUCONAZOLE 150 MG/1
150 TABLET ORAL ONCE
Qty: 1 TABLET | Refills: 0 | Status: SHIPPED | OUTPATIENT
Start: 2025-07-17 | End: 2025-07-17

## 2025-07-17 NOTE — PROGRESS NOTES
Subjective   Patient ID: Albertina Dominguez is a 39 y.o. female who presents for Flank Pain and UTI.    HPI     Patient is a 39-year-old female who presents for sick visit.  Patient complaining of flank pain that began 1 week ago.  She is concerned it might be urinary tract infection or kidney infection.  No fevers or chills.  No burning on urination.  She does have some vaginal itching with white discharge.   Pain in the back is mostly affecting the lower ribs.  Worse when working out.  She states that she recently started working out more heavily.    She is also concerned about left-sided lower back pain that is radiating to the anterior thigh.  It has been going on for many months.  She had lumbar imaging and x-rays which were unremarkable.        Review of Systems  Constitutional: No fever or chills  Cardiovascular: no chest pain, no palpitations and no syncope.   Respiratory: no cough, no shortness of breath during exertion and no shortness of breath at rest.   Gastrointestinal: no abdominal pain, no nausea and no vomiting.  Neuro: No Headache, no dizziness  Abdomen soft nontender nondistended positive bowel sounds x 4  MSK reproducible back pain to palpation of the 9th and 10th ribs posteriorly on the right    Objective   /78   Pulse 60   Wt 110 kg (242 lb)   SpO2 98%   BMI 39.06 kg/m²     Physical Exam  Constitutional: Alert and in no acute distress. Well developed, well nourished  Head and Face: Head and face: Normal.    Cardiovascular: Heart rate and rhythm were normal, normal S1 and S2. No peripheral edema.   Pulmonary: No respiratory distress. Clear bilateral breath sounds.  Musculoskeletal: Gait and station: Normal. Muscle strength/tone: Normal.   Skin: Normal skin color and pigmentation, normal skin turgor, and no rash.    Psychiatric: Judgment and insight: Intact. Mood and affect: Normal.    Procedures    Lab Results   Component Value Date    WBC 7.2 09/19/2024    HGB 10.5 (L) 09/19/2024     HCT 34.0 (L) 09/19/2024     09/19/2024    CHOL 187 09/19/2024    TRIG 74 09/19/2024    HDL 58.1 09/19/2024    ALT 10 09/19/2024    AST 12 09/19/2024     09/19/2024    K 4.3 09/19/2024     09/19/2024    CREATININE 0.87 09/19/2024    BUN 11 09/19/2024    CO2 32 09/19/2024    TSH 2.07 06/23/2023    HGBA1C 5.2 08/28/2018       XR ribs left 2 views  Narrative: Interpreted By:  Alpesh Corbin,   STUDY:  XR RIBS LEFT 2 VIEWS; ;  6/13/2025 11:21 am      INDICATION:  Signs/Symptoms:rib deepika.      ,R07.89 Other chest pain      COMPARISON:  None.      ACCESSION NUMBER(S):  [id number]      ORDERING CLINICIAN:  OLI BECKWITH      FINDINGS:  Rib series, five views      There is no evidence of a rib fracture. There is no pneumothorax. No  airspace disease or effusion seen      Impression:     No left rib fracture or pneumothorax          MACRO:  None      Signed by: Alpesh Corbin 6/14/2025 9:38 AM  Dictation workstation:   [id number]  XR lumbar spine 2-3 views  Narrative: Interpreted By:  Alpesh Corbin,   STUDY:  XR LUMBAR SPINE 2-3 VIEWS; ;  6/13/2025 11:21 am      INDICATION:  Signs/Symptoms:lower back pain.      ,M79.605 Pain in left leg      COMPARISON:  None.      ACCESSION NUMBER(S):  [id number]      ORDERING CLINICIAN:  OLI BECKWITH      FINDINGS:  Lumbar spine, three views      Mild facet disease lower lumbar spine.  There is no fracture. There  is no spondylolisthesis. There is no disc space narrowing or  osteophytosis. The prevertebral soft tissues are within normal limits.      Impression: Mild facet disease. No other abnormality          MACRO:  None      Signed by: Alpesh Corbin 6/14/2025 9:38 AM  Dictation workstation:   [id number]            Assessment/Plan   Problem List Items Addressed This Visit           ICD-10-CM    Generalized tendinopathy M67.90    Relevant Medications    ibuprofen 800 mg tablet     Other Visit Diagnoses         Codes      Flank pain    -  Primary R10.9     Relevant Orders    Urinalysis with Reflex Microscopic    Renal function panel      Other polyneuropathy     G62.89    Relevant Orders    EMG & nerve conduction      Vaginal yeast infection     B37.31    Relevant Medications    fluconazole (Diflucan) 150 mg tablet          Flank pain is more likely to be costochondritis as it is not entirely affecting the flanks rather it is affecting the area overlying the 9th and 10th ribs posteriorly.  Recommend as needed to avoi heavy lifting.  Ice area as neededd     Yeast infection.  Start Diflucan 150 mg once    With the peripheral neuropathy is likely to be due to a pinched nerve however given the duration of symptoms we will order an EMG for further evaluation.  Will call with results testing

## 2025-07-17 NOTE — PATIENT INSTRUCTIONS
We kindly ask that you take the lead and scheduling your referral appointments to ensure they align best with your availability by calling [phone].  For laboratory tests we encourage you to schedule an appointment online [email or web address] but walk-ins are available as well.  For radiology testing you can call [phone] or [phone] to schedule.  If for any reason you are having difficulty scheduling your appointments please feel free to reach out at our office by calling [phone] to assist further

## 2025-07-18 DIAGNOSIS — R10.9 FLANK PAIN: Primary | ICD-10-CM

## 2025-07-18 LAB
ALBUMIN SERPL-MCNC: 4.3 G/DL (ref 3.6–5.1)
APPEARANCE UR: CLEAR
BACTERIA #/AREA URNS HPF: ABNORMAL /HPF
BILIRUB UR QL STRIP: NEGATIVE
BUN SERPL-MCNC: 7 MG/DL (ref 7–25)
BUN/CREAT SERPL: NORMAL (CALC) (ref 6–22)
CALCIUM SERPL-MCNC: 9.1 MG/DL (ref 8.6–10.2)
CHLORIDE SERPL-SCNC: 107 MMOL/L (ref 98–110)
CO2 SERPL-SCNC: 24 MMOL/L (ref 20–32)
COLOR UR: YELLOW
CREAT SERPL-MCNC: 0.77 MG/DL (ref 0.5–0.97)
EGFRCR SERPLBLD CKD-EPI 2021: 101 ML/MIN/1.73M2
GLUCOSE SERPL-MCNC: 92 MG/DL (ref 65–99)
GLUCOSE UR QL STRIP: NEGATIVE
HGB UR QL STRIP: ABNORMAL
HYALINE CASTS #/AREA URNS LPF: ABNORMAL /LPF
KETONES UR QL STRIP: ABNORMAL
LEUKOCYTE ESTERASE UR QL STRIP: NEGATIVE
NITRITE UR QL STRIP: NEGATIVE
PH UR STRIP: 6 [PH] (ref 5–8)
PHOSPHATE SERPL-MCNC: 3.4 MG/DL (ref 2.5–4.5)
POTASSIUM SERPL-SCNC: 4.1 MMOL/L (ref 3.5–5.3)
PROT UR QL STRIP: NEGATIVE
RBC #/AREA URNS HPF: ABNORMAL /HPF
SERVICE CMNT-IMP: ABNORMAL
SODIUM SERPL-SCNC: 138 MMOL/L (ref 135–146)
SP GR UR STRIP: 1.02 (ref 1–1.03)
SQUAMOUS #/AREA URNS HPF: ABNORMAL /HPF
WBC #/AREA URNS HPF: ABNORMAL /HPF

## 2025-07-30 ENCOUNTER — APPOINTMENT (OUTPATIENT)
Dept: RADIOLOGY | Facility: CLINIC | Age: 40
End: 2025-07-30
Payer: COMMERCIAL

## 2025-08-07 ENCOUNTER — APPOINTMENT (OUTPATIENT)
Dept: OBSTETRICS AND GYNECOLOGY | Facility: CLINIC | Age: 40
End: 2025-08-07
Payer: COMMERCIAL

## 2025-08-11 ENCOUNTER — HOSPITAL ENCOUNTER (OUTPATIENT)
Dept: NEUROLOGY | Facility: CLINIC | Age: 40
Discharge: HOME | End: 2025-08-11
Payer: COMMERCIAL

## 2025-08-11 DIAGNOSIS — G62.89 OTHER POLYNEUROPATHY: ICD-10-CM

## 2025-08-11 PROCEDURE — 95886 MUSC TEST DONE W/N TEST COMP: CPT | Mod: GC | Performed by: PSYCHIATRY & NEUROLOGY

## 2025-08-11 PROCEDURE — 95908 NRV CNDJ TST 3-4 STUDIES: CPT | Mod: GC | Performed by: PSYCHIATRY & NEUROLOGY

## 2025-08-11 PROCEDURE — 95908 NRV CNDJ TST 3-4 STUDIES: CPT | Performed by: PSYCHIATRY & NEUROLOGY

## 2025-08-11 PROCEDURE — 95886 MUSC TEST DONE W/N TEST COMP: CPT | Performed by: PSYCHIATRY & NEUROLOGY

## 2025-08-28 ENCOUNTER — APPOINTMENT (OUTPATIENT)
Facility: CLINIC | Age: 40
End: 2025-08-28
Payer: COMMERCIAL

## 2025-09-05 ENCOUNTER — OFFICE VISIT (OUTPATIENT)
Dept: PRIMARY CARE | Facility: CLINIC | Age: 40
End: 2025-09-05
Payer: COMMERCIAL

## 2025-09-05 VITALS
BODY MASS INDEX: 37.93 KG/M2 | WEIGHT: 235 LBS | DIASTOLIC BLOOD PRESSURE: 73 MMHG | TEMPERATURE: 98.3 F | OXYGEN SATURATION: 98 % | HEART RATE: 60 BPM | SYSTOLIC BLOOD PRESSURE: 118 MMHG

## 2025-09-05 DIAGNOSIS — J32.0 MAXILLARY SINUSITIS, UNSPECIFIED CHRONICITY: Primary | ICD-10-CM

## 2025-09-05 DIAGNOSIS — K21.9 LARYNGOPHARYNGEAL REFLUX: ICD-10-CM

## 2025-09-05 PROCEDURE — 1036F TOBACCO NON-USER: CPT | Performed by: INTERNAL MEDICINE

## 2025-09-05 PROCEDURE — 99213 OFFICE O/P EST LOW 20 MIN: CPT | Performed by: INTERNAL MEDICINE

## 2025-09-05 RX ORDER — FLUCONAZOLE 150 MG/1
150 TABLET ORAL ONCE
Qty: 1 TABLET | Refills: 0 | Status: SHIPPED | OUTPATIENT
Start: 2025-09-05 | End: 2025-09-05

## 2025-09-05 RX ORDER — BENZONATATE 100 MG/1
100 CAPSULE ORAL 3 TIMES DAILY PRN
Qty: 21 CAPSULE | Refills: 0 | Status: SHIPPED | OUTPATIENT
Start: 2025-09-05 | End: 2025-09-12

## 2025-09-05 RX ORDER — AMOXICILLIN AND CLAVULANATE POTASSIUM 875; 125 MG/1; MG/1
875 TABLET, FILM COATED ORAL 2 TIMES DAILY
Qty: 14 TABLET | Refills: 0 | Status: SHIPPED | OUTPATIENT
Start: 2025-09-05 | End: 2025-09-12

## 2025-09-05 RX ORDER — OMEPRAZOLE 40 MG/1
40 CAPSULE, DELAYED RELEASE ORAL DAILY
Qty: 90 CAPSULE | Refills: 1 | Status: SHIPPED | OUTPATIENT
Start: 2025-09-05